# Patient Record
Sex: FEMALE | Race: WHITE | NOT HISPANIC OR LATINO | URBAN - METROPOLITAN AREA
[De-identification: names, ages, dates, MRNs, and addresses within clinical notes are randomized per-mention and may not be internally consistent; named-entity substitution may affect disease eponyms.]

---

## 2020-04-20 ENCOUNTER — INPATIENT (INPATIENT)
Facility: HOSPITAL | Age: 65
LOS: 14 days | Discharge: HOME | DRG: 177 | End: 2020-05-05
Attending: INTERNAL MEDICINE | Admitting: INTERNAL MEDICINE
Payer: MEDICARE

## 2020-04-20 VITALS
RESPIRATION RATE: 26 BRPM | HEIGHT: 65 IN | DIASTOLIC BLOOD PRESSURE: 49 MMHG | OXYGEN SATURATION: 85 % | SYSTOLIC BLOOD PRESSURE: 107 MMHG | WEIGHT: 125 LBS | TEMPERATURE: 99 F | HEART RATE: 91 BPM

## 2020-04-20 DIAGNOSIS — J18.9 PNEUMONIA, UNSPECIFIED ORGANISM: ICD-10-CM

## 2020-04-20 LAB
ALBUMIN SERPL ELPH-MCNC: 3.1 G/DL — LOW (ref 3.3–5.2)
ALP SERPL-CCNC: 72 U/L — SIGNIFICANT CHANGE UP (ref 40–120)
ALT FLD-CCNC: 19 U/L — SIGNIFICANT CHANGE UP
ANION GAP SERPL CALC-SCNC: 13 MMOL/L — SIGNIFICANT CHANGE UP (ref 5–17)
AST SERPL-CCNC: 30 U/L — SIGNIFICANT CHANGE UP
BASOPHILS # BLD AUTO: 0.01 K/UL — SIGNIFICANT CHANGE UP (ref 0–0.2)
BASOPHILS NFR BLD AUTO: 0.3 % — SIGNIFICANT CHANGE UP (ref 0–2)
BILIRUB SERPL-MCNC: <0.2 MG/DL — LOW (ref 0.4–2)
BUN SERPL-MCNC: 23 MG/DL — HIGH (ref 8–20)
CALCIUM SERPL-MCNC: 8.8 MG/DL — SIGNIFICANT CHANGE UP (ref 8.6–10.2)
CHLORIDE SERPL-SCNC: 102 MMOL/L — SIGNIFICANT CHANGE UP (ref 98–107)
CO2 SERPL-SCNC: 26 MMOL/L — SIGNIFICANT CHANGE UP (ref 22–29)
CREAT SERPL-MCNC: 1.37 MG/DL — HIGH (ref 0.5–1.3)
CRP SERPL-MCNC: 10.18 MG/DL — HIGH (ref 0–0.4)
EOSINOPHIL # BLD AUTO: 0.01 K/UL — SIGNIFICANT CHANGE UP (ref 0–0.5)
EOSINOPHIL NFR BLD AUTO: 0.3 % — SIGNIFICANT CHANGE UP (ref 0–6)
FERRITIN SERPL-MCNC: 246 NG/ML — HIGH (ref 15–150)
GLUCOSE SERPL-MCNC: 96 MG/DL — SIGNIFICANT CHANGE UP (ref 70–99)
HCT VFR BLD CALC: 31.4 % — LOW (ref 34.5–45)
HGB BLD-MCNC: 9.9 G/DL — LOW (ref 11.5–15.5)
IMM GRANULOCYTES NFR BLD AUTO: 0.3 % — SIGNIFICANT CHANGE UP (ref 0–1.5)
LDH SERPL L TO P-CCNC: 335 U/L — HIGH (ref 98–192)
LYMPHOCYTES # BLD AUTO: 0.67 K/UL — LOW (ref 1–3.3)
LYMPHOCYTES # BLD AUTO: 20.7 % — SIGNIFICANT CHANGE UP (ref 13–44)
MCHC RBC-ENTMCNC: 31.1 PG — SIGNIFICANT CHANGE UP (ref 27–34)
MCHC RBC-ENTMCNC: 31.5 GM/DL — LOW (ref 32–36)
MCV RBC AUTO: 98.7 FL — SIGNIFICANT CHANGE UP (ref 80–100)
MONOCYTES # BLD AUTO: 0.15 K/UL — SIGNIFICANT CHANGE UP (ref 0–0.9)
MONOCYTES NFR BLD AUTO: 4.6 % — SIGNIFICANT CHANGE UP (ref 2–14)
NEUTROPHILS # BLD AUTO: 2.38 K/UL — SIGNIFICANT CHANGE UP (ref 1.8–7.4)
NEUTROPHILS NFR BLD AUTO: 73.8 % — SIGNIFICANT CHANGE UP (ref 43–77)
PLATELET # BLD AUTO: 281 K/UL — SIGNIFICANT CHANGE UP (ref 150–400)
POTASSIUM SERPL-MCNC: 4.8 MMOL/L — SIGNIFICANT CHANGE UP (ref 3.5–5.3)
POTASSIUM SERPL-SCNC: 4.8 MMOL/L — SIGNIFICANT CHANGE UP (ref 3.5–5.3)
PROCALCITONIN SERPL-MCNC: 0.16 NG/ML — HIGH (ref 0.02–0.1)
PROT SERPL-MCNC: 6.2 G/DL — LOW (ref 6.6–8.7)
RBC # BLD: 3.18 M/UL — LOW (ref 3.8–5.2)
RBC # FLD: 14.4 % — SIGNIFICANT CHANGE UP (ref 10.3–14.5)
SODIUM SERPL-SCNC: 141 MMOL/L — SIGNIFICANT CHANGE UP (ref 135–145)
WBC # BLD: 3.23 K/UL — LOW (ref 3.8–10.5)
WBC # FLD AUTO: 3.23 K/UL — LOW (ref 3.8–10.5)

## 2020-04-20 PROCEDURE — 99222 1ST HOSP IP/OBS MODERATE 55: CPT | Mod: CS

## 2020-04-20 PROCEDURE — 71045 X-RAY EXAM CHEST 1 VIEW: CPT | Mod: 26

## 2020-04-20 PROCEDURE — 99285 EMERGENCY DEPT VISIT HI MDM: CPT | Mod: CS

## 2020-04-20 RX ORDER — OXCARBAZEPINE 300 MG/1
300 TABLET, FILM COATED ORAL
Refills: 0 | Status: DISCONTINUED | OUTPATIENT
Start: 2020-04-20 | End: 2020-05-05

## 2020-04-20 RX ORDER — MIRTAZAPINE 45 MG/1
15 TABLET, ORALLY DISINTEGRATING ORAL AT BEDTIME
Refills: 0 | Status: DISCONTINUED | OUTPATIENT
Start: 2020-04-20 | End: 2020-05-05

## 2020-04-20 RX ORDER — HYDROXYCHLOROQUINE SULFATE 200 MG
800 TABLET ORAL EVERY 24 HOURS
Refills: 0 | Status: COMPLETED | OUTPATIENT
Start: 2020-04-20 | End: 2020-04-20

## 2020-04-20 RX ORDER — ACETAMINOPHEN 500 MG
650 TABLET ORAL EVERY 6 HOURS
Refills: 0 | Status: DISCONTINUED | OUTPATIENT
Start: 2020-04-20 | End: 2020-04-23

## 2020-04-20 RX ORDER — SERTRALINE 25 MG/1
50 TABLET, FILM COATED ORAL DAILY
Refills: 0 | Status: DISCONTINUED | OUTPATIENT
Start: 2020-04-20 | End: 2020-04-23

## 2020-04-20 RX ORDER — DOCUSATE SODIUM 100 MG
1 CAPSULE ORAL
Qty: 0 | Refills: 0 | DISCHARGE

## 2020-04-20 RX ORDER — SODIUM CHLORIDE 9 MG/ML
1000 INJECTION INTRAMUSCULAR; INTRAVENOUS; SUBCUTANEOUS
Refills: 0 | Status: DISCONTINUED | OUTPATIENT
Start: 2020-04-20 | End: 2020-04-21

## 2020-04-20 RX ORDER — POLYETHYLENE GLYCOL 3350 17 G/17G
17 POWDER, FOR SOLUTION ORAL DAILY
Refills: 0 | Status: DISCONTINUED | OUTPATIENT
Start: 2020-04-20 | End: 2020-05-05

## 2020-04-20 RX ORDER — CINACALCET 30 MG/1
30 TABLET, FILM COATED ORAL
Refills: 0 | Status: DISCONTINUED | OUTPATIENT
Start: 2020-04-20 | End: 2020-04-25

## 2020-04-20 RX ORDER — SERTRALINE 25 MG/1
1 TABLET, FILM COATED ORAL
Qty: 0 | Refills: 0 | DISCHARGE

## 2020-04-20 RX ORDER — CINACALCET 30 MG/1
1 TABLET, FILM COATED ORAL
Qty: 0 | Refills: 0 | DISCHARGE

## 2020-04-20 RX ORDER — OXCARBAZEPINE 300 MG/1
1 TABLET, FILM COATED ORAL
Qty: 0 | Refills: 0 | DISCHARGE

## 2020-04-20 RX ORDER — OLANZAPINE 15 MG/1
5 TABLET, FILM COATED ORAL
Refills: 0 | Status: DISCONTINUED | OUTPATIENT
Start: 2020-04-20 | End: 2020-04-27

## 2020-04-20 RX ORDER — HYDROXYCHLOROQUINE SULFATE 200 MG
400 TABLET ORAL EVERY 24 HOURS
Refills: 0 | Status: COMPLETED | OUTPATIENT
Start: 2020-04-21 | End: 2020-04-24

## 2020-04-20 RX ORDER — CEFTRIAXONE 500 MG/1
1000 INJECTION, POWDER, FOR SOLUTION INTRAMUSCULAR; INTRAVENOUS ONCE
Refills: 0 | Status: COMPLETED | OUTPATIENT
Start: 2020-04-20 | End: 2020-04-20

## 2020-04-20 RX ORDER — SENNA PLUS 8.6 MG/1
2 TABLET ORAL AT BEDTIME
Refills: 0 | Status: DISCONTINUED | OUTPATIENT
Start: 2020-04-20 | End: 2020-05-05

## 2020-04-20 RX ORDER — LEVOTHYROXINE SODIUM 125 MCG
1 TABLET ORAL
Qty: 0 | Refills: 0 | DISCHARGE

## 2020-04-20 RX ORDER — MIRTAZAPINE 45 MG/1
1 TABLET, ORALLY DISINTEGRATING ORAL
Qty: 0 | Refills: 0 | DISCHARGE

## 2020-04-20 RX ORDER — LEVOTHYROXINE SODIUM 125 MCG
75 TABLET ORAL DAILY
Refills: 0 | Status: DISCONTINUED | OUTPATIENT
Start: 2020-04-20 | End: 2020-05-05

## 2020-04-20 RX ORDER — HYDROXYCHLOROQUINE SULFATE 200 MG
TABLET ORAL
Refills: 0 | Status: COMPLETED | OUTPATIENT
Start: 2020-04-20 | End: 2020-04-24

## 2020-04-20 RX ORDER — ALBUTEROL 90 UG/1
2 AEROSOL, METERED ORAL EVERY 6 HOURS
Refills: 0 | Status: DISCONTINUED | OUTPATIENT
Start: 2020-04-20 | End: 2020-05-05

## 2020-04-20 RX ORDER — AZITHROMYCIN 500 MG/1
500 TABLET, FILM COATED ORAL ONCE
Refills: 0 | Status: DISCONTINUED | OUTPATIENT
Start: 2020-04-20 | End: 2020-04-20

## 2020-04-20 RX ORDER — ENOXAPARIN SODIUM 100 MG/ML
40 INJECTION SUBCUTANEOUS DAILY
Refills: 0 | Status: DISCONTINUED | OUTPATIENT
Start: 2020-04-20 | End: 2020-05-05

## 2020-04-20 RX ADMIN — CEFTRIAXONE 1000 MILLIGRAM(S): 500 INJECTION, POWDER, FOR SOLUTION INTRAMUSCULAR; INTRAVENOUS at 18:13

## 2020-04-20 RX ADMIN — CINACALCET 30 MILLIGRAM(S): 30 TABLET, FILM COATED ORAL at 21:58

## 2020-04-20 RX ADMIN — SENNA PLUS 2 TABLET(S): 8.6 TABLET ORAL at 21:59

## 2020-04-20 RX ADMIN — OLANZAPINE 5 MILLIGRAM(S): 15 TABLET, FILM COATED ORAL at 21:59

## 2020-04-20 RX ADMIN — SODIUM CHLORIDE 83 MILLILITER(S): 9 INJECTION INTRAMUSCULAR; INTRAVENOUS; SUBCUTANEOUS at 19:02

## 2020-04-20 RX ADMIN — MIRTAZAPINE 15 MILLIGRAM(S): 45 TABLET, ORALLY DISINTEGRATING ORAL at 21:59

## 2020-04-20 RX ADMIN — Medication 800 MILLIGRAM(S): at 21:58

## 2020-04-20 RX ADMIN — OXCARBAZEPINE 300 MILLIGRAM(S): 300 TABLET, FILM COATED ORAL at 21:59

## 2020-04-20 RX ADMIN — CEFTRIAXONE 100 MILLIGRAM(S): 500 INJECTION, POWDER, FOR SOLUTION INTRAMUSCULAR; INTRAVENOUS at 17:28

## 2020-04-20 NOTE — H&P ADULT - HISTORY OF PRESENT ILLNESS
66 yo F w/ hx bipolar disorder, intermittently nonverbal presents to ER for persistent fevers and new hypoxia (80's on room air) since being diagnosed with COVID week prior as outpatient.  per aide at bedside patient noted to have poor PO intake, poor urine output and fevers/hypoxia. been checking pulse ox intermittently. denies nausea/vomiting or diarrhea. +BM's.   at baseline, ambulates and able to feed self.   ros unable to obtain as patient not responding to questions/commands    in Er , hypoxic at 85% RA and improved with 100% o2

## 2020-04-20 NOTE — ED ADULT NURSE REASSESSMENT NOTE - NS ED NURSE REASSESS COMMENT FT1
02 changed from NRB  to 4L NC  magdy well  aide at bedside  ginger ale given.   pt not talking  'I guess she is scared"

## 2020-04-20 NOTE — H&P ADULT - ASSESSMENT
66 yo F w/ hx bipolar disorder, intermittently nonverbal presents to ER for persistent fevers and new hypoxia (80's on room air) since being diagnosed with COVID week prior as outpatient.  in Er , hypoxic at 85% RA and improved with 100% o2      acute hypoxic resp failure due to COVID19    plaquenil, o2 as tolerated     antitussives, antipyretics     trend inflammatory markers, if worsening start steroids    f/u CXR read     ALVARO vs CKD? (side effect of prior lithium use?)   trend, IVF    urinalysis/urine cx     bipolar disorder: continue home meds    sertraline, oxcarbazepine, olanzapine, remeron    hypothyroid: synthroid    FULL code after discussion with daughter/HCP Arcelia [phone]

## 2020-04-20 NOTE — ED PROVIDER NOTE - CLINICAL SUMMARY MEDICAL DECISION MAKING FREE TEXT BOX
The patient presents with worsening of SOB and cough with COVID-19 and will admit for further evaluation

## 2020-04-20 NOTE — ED PROVIDER NOTE - OBJECTIVE STATEMENT
The patient is a 65 year old female presents with fever and cough and SOB and had the COVID-19 test last week and came back positive and worsening of symptoms of SOB and dyspnea

## 2020-04-20 NOTE — ED ADULT NURSE REASSESSMENT NOTE - NS ED NURSE REASSESS COMMENT FT1
pts aide at bedside pt lives with aide at home   +COVID from last week,  'this week her o2 levels went down'  pt on NRB presently   sats 99%

## 2020-04-21 LAB
ALBUMIN SERPL ELPH-MCNC: 3.4 G/DL — SIGNIFICANT CHANGE UP (ref 3.3–5.2)
ALP SERPL-CCNC: 82 U/L — SIGNIFICANT CHANGE UP (ref 40–120)
ALT FLD-CCNC: 18 U/L — SIGNIFICANT CHANGE UP
ANION GAP SERPL CALC-SCNC: 14 MMOL/L — SIGNIFICANT CHANGE UP (ref 5–17)
AST SERPL-CCNC: 29 U/L — SIGNIFICANT CHANGE UP
BASOPHILS # BLD AUTO: 0 K/UL — SIGNIFICANT CHANGE UP (ref 0–0.2)
BASOPHILS NFR BLD AUTO: 0 % — SIGNIFICANT CHANGE UP (ref 0–2)
BILIRUB SERPL-MCNC: <0.2 MG/DL — LOW (ref 0.4–2)
BUN SERPL-MCNC: 20 MG/DL — SIGNIFICANT CHANGE UP (ref 8–20)
CALCIUM SERPL-MCNC: 9.5 MG/DL — SIGNIFICANT CHANGE UP (ref 8.6–10.2)
CHLORIDE SERPL-SCNC: 113 MMOL/L — HIGH (ref 98–107)
CO2 SERPL-SCNC: 28 MMOL/L — SIGNIFICANT CHANGE UP (ref 22–29)
CREAT SERPL-MCNC: 1.24 MG/DL — SIGNIFICANT CHANGE UP (ref 0.5–1.3)
CRP SERPL-MCNC: 12.28 MG/DL — HIGH (ref 0–0.4)
EOSINOPHIL # BLD AUTO: 0.01 K/UL — SIGNIFICANT CHANGE UP (ref 0–0.5)
EOSINOPHIL NFR BLD AUTO: 0.3 % — SIGNIFICANT CHANGE UP (ref 0–6)
FERRITIN SERPL-MCNC: 271 NG/ML — HIGH (ref 15–150)
GLUCOSE SERPL-MCNC: 77 MG/DL — SIGNIFICANT CHANGE UP (ref 70–99)
HCT VFR BLD CALC: 36.3 % — SIGNIFICANT CHANGE UP (ref 34.5–45)
HCV AB S/CO SERPL IA: 0.1 S/CO — SIGNIFICANT CHANGE UP (ref 0–0.99)
HCV AB SERPL-IMP: SIGNIFICANT CHANGE UP
HGB BLD-MCNC: 11.3 G/DL — LOW (ref 11.5–15.5)
IMM GRANULOCYTES NFR BLD AUTO: 0.3 % — SIGNIFICANT CHANGE UP (ref 0–1.5)
LDH SERPL L TO P-CCNC: 349 U/L — HIGH (ref 98–192)
LITHIUM SERPL-MCNC: <0.1 MMOL/L — LOW (ref 0.5–1.5)
LYMPHOCYTES # BLD AUTO: 0.57 K/UL — LOW (ref 1–3.3)
LYMPHOCYTES # BLD AUTO: 18.8 % — SIGNIFICANT CHANGE UP (ref 13–44)
MCHC RBC-ENTMCNC: 31 PG — SIGNIFICANT CHANGE UP (ref 27–34)
MCHC RBC-ENTMCNC: 31.1 GM/DL — LOW (ref 32–36)
MCV RBC AUTO: 99.7 FL — SIGNIFICANT CHANGE UP (ref 80–100)
MONOCYTES # BLD AUTO: 0.11 K/UL — SIGNIFICANT CHANGE UP (ref 0–0.9)
MONOCYTES NFR BLD AUTO: 3.6 % — SIGNIFICANT CHANGE UP (ref 2–14)
NEUTROPHILS # BLD AUTO: 2.34 K/UL — SIGNIFICANT CHANGE UP (ref 1.8–7.4)
NEUTROPHILS NFR BLD AUTO: 77 % — SIGNIFICANT CHANGE UP (ref 43–77)
PLATELET # BLD AUTO: 298 K/UL — SIGNIFICANT CHANGE UP (ref 150–400)
POTASSIUM SERPL-MCNC: 5.7 MMOL/L — HIGH (ref 3.5–5.3)
POTASSIUM SERPL-SCNC: 5.7 MMOL/L — HIGH (ref 3.5–5.3)
PROT SERPL-MCNC: 6.9 G/DL — SIGNIFICANT CHANGE UP (ref 6.6–8.7)
RBC # BLD: 3.64 M/UL — LOW (ref 3.8–5.2)
RBC # FLD: 14.5 % — SIGNIFICANT CHANGE UP (ref 10.3–14.5)
SODIUM SERPL-SCNC: 155 MMOL/L — HIGH (ref 135–145)
WBC # BLD: 3.04 K/UL — LOW (ref 3.8–10.5)
WBC # FLD AUTO: 3.04 K/UL — LOW (ref 3.8–10.5)

## 2020-04-21 PROCEDURE — 99233 SBSQ HOSP IP/OBS HIGH 50: CPT | Mod: CS

## 2020-04-21 RX ORDER — SODIUM CHLORIDE 9 MG/ML
1000 INJECTION, SOLUTION INTRAVENOUS
Refills: 0 | Status: DISCONTINUED | OUTPATIENT
Start: 2020-04-21 | End: 2020-04-22

## 2020-04-21 RX ADMIN — Medication 400 MILLIGRAM(S): at 17:06

## 2020-04-21 RX ADMIN — OXCARBAZEPINE 300 MILLIGRAM(S): 300 TABLET, FILM COATED ORAL at 17:06

## 2020-04-21 RX ADMIN — POLYETHYLENE GLYCOL 3350 17 GRAM(S): 17 POWDER, FOR SOLUTION ORAL at 12:25

## 2020-04-21 RX ADMIN — OLANZAPINE 5 MILLIGRAM(S): 15 TABLET, FILM COATED ORAL at 05:37

## 2020-04-21 RX ADMIN — CINACALCET 30 MILLIGRAM(S): 30 TABLET, FILM COATED ORAL at 05:37

## 2020-04-21 RX ADMIN — SERTRALINE 50 MILLIGRAM(S): 25 TABLET, FILM COATED ORAL at 12:25

## 2020-04-21 RX ADMIN — OLANZAPINE 5 MILLIGRAM(S): 15 TABLET, FILM COATED ORAL at 17:06

## 2020-04-21 RX ADMIN — ENOXAPARIN SODIUM 40 MILLIGRAM(S): 100 INJECTION SUBCUTANEOUS at 12:25

## 2020-04-21 RX ADMIN — SENNA PLUS 2 TABLET(S): 8.6 TABLET ORAL at 21:00

## 2020-04-21 RX ADMIN — OXCARBAZEPINE 300 MILLIGRAM(S): 300 TABLET, FILM COATED ORAL at 05:37

## 2020-04-21 RX ADMIN — CINACALCET 30 MILLIGRAM(S): 30 TABLET, FILM COATED ORAL at 16:35

## 2020-04-21 RX ADMIN — Medication 75 MICROGRAM(S): at 05:37

## 2020-04-21 RX ADMIN — MIRTAZAPINE 15 MILLIGRAM(S): 45 TABLET, ORALLY DISINTEGRATING ORAL at 21:00

## 2020-04-21 NOTE — PROGRESS NOTE ADULT - SUBJECTIVE AND OBJECTIVE BOX
CC: hypoxia (20 Apr 2020 18:00)    INTERVAL HPI/OVERNIGHT EVENTS:  this morning 95% on 4 L NC    Vital Signs Last 24 Hrs  T(C): 37.3 (21 Apr 2020 03:46), Max: 37.6 (21 Apr 2020 00:25)  T(F): 99.1 (21 Apr 2020 03:46), Max: 99.6 (21 Apr 2020 00:25)  HR: 84 (21 Apr 2020 03:46) (70 - 91)  BP: 143/82 (21 Apr 2020 03:46) (107/49 - 143/82)  BP(mean): --  RR: 18 (21 Apr 2020 03:46) (18 - 26)  SpO2: 95% (21 Apr 2020 03:46) (85% - 99%)    PHYSICAL EXAM:  General: Well developed; well nourished; in no acute distress  Eyes: PERRLA, EOMI; conjunctiva and sclera clear  Head: Normocephalic; atraumatic  ENMT: No nasal discharge; airway clear  Neck: Supple; non tender; no masses  Respiratory: No wheezes, rales or rhonchi  Cardiovascular: Regular rate and rhythm. S1 and S2 Normal; No murmurs, gallops or rubs  Gastrointestinal: Soft non-tender non-distended; Normal bowel sounds  Genitourinary: No costovertebral angle tenderness  Extremities: Normal range of motion, No clubbing, cyanosis or edema  Vascular: Peripheral pulses palpable 2+ bilaterally  Neurological: Alert and oriented x4  Skin: Warm and dry. No acute rash  Lymph Nodes: No acute cervical adenopathy  Musculoskeletal: Normal gait, tone, without deformities  Psychiatric: Cooperative and appropriate    I&O's Detail                        9.9    3.23  )-----------( 281      ( 20 Apr 2020 16:12 )             31.4     20 Apr 2020 16:12    141    |  102    |  23.0   ----------------------------<  96     4.8     |  26.0   |  1.37     Ca    8.8        20 Apr 2020 16:12    TPro  6.2    /  Alb  3.1    /  TBili  <0.2   /  DBili  x      /  AST  30     /  ALT  19     /  AlkPhos  72     20 Apr 2020 16:12    LIVER FUNCTIONS - ( 20 Apr 2020 16:12 )  Alb: 3.1 g/dL / Pro: 6.2 g/dL / ALK PHOS: 72 U/L / ALT: 19 U/L / AST: 30 U/L / GGT: x           MEDICATIONS  (STANDING):  cinacalcet 30 milliGRAM(s) Oral two times a day  enoxaparin Injectable 40 milliGRAM(s) SubCutaneous daily  hydroxychloroquine   Oral   hydroxychloroquine 400 milliGRAM(s) Oral every 24 hours  levothyroxine 75 MICROGram(s) Oral daily  mirtazapine 15 milliGRAM(s) Oral at bedtime  OLANZapine 5 milliGRAM(s) Oral two times a day  OXcarbazepine 300 milliGRAM(s) Oral two times a day  polyethylene glycol 3350 17 Gram(s) Oral daily  senna 2 Tablet(s) Oral at bedtime  sertraline 50 milliGRAM(s) Oral daily  sodium chloride 0.9%. 1000 milliLiter(s) (83 mL/Hr) IV Continuous <Continuous>    MEDICATIONS  (PRN):  acetaminophen   Tablet .. 650 milliGRAM(s) Oral every 6 hours PRN Temp greater or equal to 38C (100.4F), Mild Pain (1 - 3), Moderate Pain (4 - 6)  ALBUTerol    90 MICROgram(s) HFA Inhaler 2 Puff(s) Inhalation every 6 hours PRN Shortness of Breath and/or Wheezing  bisacodyl Suppository 10 milliGRAM(s) Rectal daily PRN Constipation  guaiFENesin   Syrup  (Sugar-Free) 200 milliGRAM(s) Oral every 6 hours PRN Cough      RADIOLOGY & ADDITIONAL TESTS: CC: hypoxia (20 Apr 2020 18:00)    INTERVAL HPI/OVERNIGHT EVENTS:  this morning 90% on 5 L NC  intermittently falls off patient  she is unable to verbalize complaints  no acute events overnight  patient able to open eyes and look around but does not follow commands  resists when NC is adjusted in her nose    Vital Signs Last 24 Hrs  T(C): 37.3 (21 Apr 2020 03:46), Max: 37.6 (21 Apr 2020 00:25)  T(F): 99.1 (21 Apr 2020 03:46), Max: 99.6 (21 Apr 2020 00:25)  HR: 84 (21 Apr 2020 03:46) (70 - 91)  BP: 143/82 (21 Apr 2020 03:46) (107/49 - 143/82)  BP(mean): --  RR: 18 (21 Apr 2020 03:46) (18 - 26)  SpO2: 95% (21 Apr 2020 03:46) (85% - 99%)    PHYSICAL EXAM:  General: in no acute distress; anxious appearing, resting tremor of the upper extremities noted  ENMT: No nasal discharge; airway clear; edentulous; NC in place  Gastrointestinal: Soft non-tender non-distended; Normal bowel sounds  Extremities: Normal range of motion, No clubbing, cyanosis or edema  Vascular: Peripheral pulses palpable 2+ bilaterally  Neurological: Alert at times during exam and resists noxious stimuli    I&O's Detail                        9.9    3.23  )-----------( 281      ( 20 Apr 2020 16:12 )             31.4     20 Apr 2020 16:12    141    |  102    |  23.0   ----------------------------<  96     4.8     |  26.0   |  1.37     Ca    8.8        20 Apr 2020 16:12    TPro  6.2    /  Alb  3.1    /  TBili  <0.2   /  DBili  x      /  AST  30     /  ALT  19     /  AlkPhos  72     20 Apr 2020 16:12    LIVER FUNCTIONS - ( 20 Apr 2020 16:12 )  Alb: 3.1 g/dL / Pro: 6.2 g/dL / ALK PHOS: 72 U/L / ALT: 19 U/L / AST: 30 U/L / GGT: x           MEDICATIONS  (STANDING):  cinacalcet 30 milliGRAM(s) Oral two times a day  enoxaparin Injectable 40 milliGRAM(s) SubCutaneous daily  hydroxychloroquine   Oral   hydroxychloroquine 400 milliGRAM(s) Oral every 24 hours  levothyroxine 75 MICROGram(s) Oral daily  mirtazapine 15 milliGRAM(s) Oral at bedtime  OLANZapine 5 milliGRAM(s) Oral two times a day  OXcarbazepine 300 milliGRAM(s) Oral two times a day  polyethylene glycol 3350 17 Gram(s) Oral daily  senna 2 Tablet(s) Oral at bedtime  sertraline 50 milliGRAM(s) Oral daily  sodium chloride 0.9%. 1000 milliLiter(s) (83 mL/Hr) IV Continuous <Continuous>    MEDICATIONS  (PRN):  acetaminophen   Tablet .. 650 milliGRAM(s) Oral every 6 hours PRN Temp greater or equal to 38C (100.4F), Mild Pain (1 - 3), Moderate Pain (4 - 6)  ALBUTerol    90 MICROgram(s) HFA Inhaler 2 Puff(s) Inhalation every 6 hours PRN Shortness of Breath and/or Wheezing  bisacodyl Suppository 10 milliGRAM(s) Rectal daily PRN Constipation  guaiFENesin   Syrup  (Sugar-Free) 200 milliGRAM(s) Oral every 6 hours PRN Cough      RADIOLOGY & ADDITIONAL TESTS:

## 2020-04-21 NOTE — PROGRESS NOTE ADULT - ASSESSMENT
66 yo F w/ hx bipolar disorder, intermittently nonverbal presents to ER for persistent fevers and new hypoxia (80's on room air) since being diagnosed with COVID week prior as outpatient. In the ED patient as noted to be hypoxic at 85% RA and improved with 100% o2 with 4 L NC    #acute hypoxic resp failure due to COVID19  - plaquenil, o2 as tolerated   - antitussives, antipyretics   - trend inflammatory markers, if worsening start steroids    #ALVARO vs CKD? (side effect of prior lithium use?)  - f/u BMP  - urinalysis/urine cx     #bipolar disorder: continue home meds  sertraline, oxcarbazepine, olanzapine, remeron    #hypothyroid: synthroid    FULL code after discussion with daughter/HCP Arcelia [phone] 64 yo F w/ hx bipolar disorder, intermittently nonverbal presents to ER for persistent fevers and new hypoxia (80's on room air) since being diagnosed with COVID week prior as outpatient. In the ED patient as noted to be hypoxic at 85% RA and improved with 100% o2 with 4 L NC.     #acute hypoxic resp failure due to COVID19  - plaquenil, o2 as tolerated   - antitussives, antipyretics   - trend inflammatory markers, if worsening start steroids    #ALVARO vs CKD  - f/u BMP  - urinalysis/urine cx  - trialing IV fluids - continue for now    #bipolar disorder: continue home meds  - sertraline, oxcarbazepine, olanzapine, remeron    #hypothyroid: synthroid    #Advanced directives; currently full code after admitting physician discussed with daughter/HCP Arcelia [phone]    #DVT ppx - lovenox daily

## 2020-04-22 LAB
ANION GAP SERPL CALC-SCNC: 15 MMOL/L — SIGNIFICANT CHANGE UP (ref 5–17)
APPEARANCE UR: CLEAR — SIGNIFICANT CHANGE UP
BILIRUB UR-MCNC: NEGATIVE — SIGNIFICANT CHANGE UP
BUN SERPL-MCNC: 16 MG/DL — SIGNIFICANT CHANGE UP (ref 8–20)
CALCIUM SERPL-MCNC: 9.4 MG/DL — SIGNIFICANT CHANGE UP (ref 8.6–10.2)
CHLORIDE SERPL-SCNC: 116 MMOL/L — HIGH (ref 98–107)
CO2 SERPL-SCNC: 27 MMOL/L — SIGNIFICANT CHANGE UP (ref 22–29)
COLOR SPEC: YELLOW — SIGNIFICANT CHANGE UP
CREAT SERPL-MCNC: 1.18 MG/DL — SIGNIFICANT CHANGE UP (ref 0.5–1.3)
DIFF PNL FLD: ABNORMAL
EPI CELLS # UR: SIGNIFICANT CHANGE UP
GLUCOSE SERPL-MCNC: 100 MG/DL — HIGH (ref 70–99)
GLUCOSE UR QL: NEGATIVE MG/DL — SIGNIFICANT CHANGE UP
KETONES UR-MCNC: NEGATIVE — SIGNIFICANT CHANGE UP
LEUKOCYTE ESTERASE UR-ACNC: NEGATIVE — SIGNIFICANT CHANGE UP
NITRITE UR-MCNC: NEGATIVE — SIGNIFICANT CHANGE UP
PH UR: 7 — SIGNIFICANT CHANGE UP (ref 5–8)
POTASSIUM SERPL-MCNC: 4.9 MMOL/L — SIGNIFICANT CHANGE UP (ref 3.5–5.3)
POTASSIUM SERPL-SCNC: 4.9 MMOL/L — SIGNIFICANT CHANGE UP (ref 3.5–5.3)
PROT UR-MCNC: 15 MG/DL
RBC CASTS # UR COMP ASSIST: SIGNIFICANT CHANGE UP /HPF (ref 0–4)
SODIUM SERPL-SCNC: 158 MMOL/L — HIGH (ref 135–145)
SP GR SPEC: 1 — LOW (ref 1.01–1.02)
UROBILINOGEN FLD QL: NEGATIVE MG/DL — SIGNIFICANT CHANGE UP
WBC UR QL: NEGATIVE — SIGNIFICANT CHANGE UP

## 2020-04-22 PROCEDURE — 99233 SBSQ HOSP IP/OBS HIGH 50: CPT | Mod: CS

## 2020-04-22 RX ORDER — SODIUM CHLORIDE 9 MG/ML
1000 INJECTION, SOLUTION INTRAVENOUS
Refills: 0 | Status: DISCONTINUED | OUTPATIENT
Start: 2020-04-22 | End: 2020-04-24

## 2020-04-22 RX ADMIN — SODIUM CHLORIDE 70 MILLILITER(S): 9 INJECTION, SOLUTION INTRAVENOUS at 21:30

## 2020-04-22 RX ADMIN — OLANZAPINE 5 MILLIGRAM(S): 15 TABLET, FILM COATED ORAL at 05:55

## 2020-04-22 RX ADMIN — CINACALCET 30 MILLIGRAM(S): 30 TABLET, FILM COATED ORAL at 05:55

## 2020-04-22 RX ADMIN — CINACALCET 30 MILLIGRAM(S): 30 TABLET, FILM COATED ORAL at 17:23

## 2020-04-22 RX ADMIN — SERTRALINE 50 MILLIGRAM(S): 25 TABLET, FILM COATED ORAL at 10:54

## 2020-04-22 RX ADMIN — OLANZAPINE 5 MILLIGRAM(S): 15 TABLET, FILM COATED ORAL at 17:23

## 2020-04-22 RX ADMIN — Medication 400 MILLIGRAM(S): at 17:23

## 2020-04-22 RX ADMIN — OXCARBAZEPINE 300 MILLIGRAM(S): 300 TABLET, FILM COATED ORAL at 17:23

## 2020-04-22 RX ADMIN — Medication 75 MICROGRAM(S): at 05:55

## 2020-04-22 RX ADMIN — Medication 650 MILLIGRAM(S): at 16:16

## 2020-04-22 RX ADMIN — ENOXAPARIN SODIUM 40 MILLIGRAM(S): 100 INJECTION SUBCUTANEOUS at 10:54

## 2020-04-22 RX ADMIN — OXCARBAZEPINE 300 MILLIGRAM(S): 300 TABLET, FILM COATED ORAL at 05:55

## 2020-04-22 NOTE — DIETITIAN INITIAL EVALUATION ADULT. - ADD RECOMMEND
Add Ensure Enlive BID; Rx MVI, thiamine and vit C daily; Provide encouragement/assistance as needed during mealtimes to inc PO

## 2020-04-22 NOTE — DIETITIAN INITIAL EVALUATION ADULT. - OTHER INFO
66yo female, minimally verbal with bipolar depression, hx hypothyroid, admitted with acute resp. failure 2/2 COVID-19, ALVARO/CKD. Aware Pt with poor PO intake PTA, no PO documented since admission. Remeron with appetite stimulant effect ordered.

## 2020-04-22 NOTE — PROGRESS NOTE ADULT - ASSESSMENT
66 yo F w/ hx bipolar disorder, intermittently nonverbal presents to ER for persistent fevers and new hypoxia (80's on room air) since being diagnosed with COVID week prior as outpatient. In the ED patient as noted to be hypoxic at 85% RA and improved with 100% o2 with 4 L NC.     #acute hypoxic resp failure due to COVID19  - plaquenil day 3/5, o2 as tolerated  - antitussives, antipyretics   - trend inflammatory markers, if worsening start steroids  - so far has been remaining stable  - her aide service at her private apartment will resume after 14 days of quarantine - will confirm with CM  - PT today - patient does ambulate with assistance and assist device (walker) at baseline    #ALVARO vs CKD with hypernatremia - likely CKD per daughter history; lithium induced  - f/u BMP  - urinalysis/urine cx  - trialing IV fluids - continue for now  - follow up BMP this morning    #bipolar disorder: continue home meds; for years was on lithium which controlled symptoms but was stopped due to kidney damage; she has struggled to treat bipolar since then  - sertraline, oxcarbazepine, olanzapine, remeron  - lithium level low on this admission    #hypothyroid: synthroid    #Advanced directives; currently full code after admitting physician discussed with daughter HCP Arcelia [phone]    #DVT ppx - lovenox daily

## 2020-04-23 DIAGNOSIS — F31.9 BIPOLAR DISORDER, UNSPECIFIED: ICD-10-CM

## 2020-04-23 DIAGNOSIS — F05 DELIRIUM DUE TO KNOWN PHYSIOLOGICAL CONDITION: ICD-10-CM

## 2020-04-23 LAB
AMMONIA BLD-MCNC: 33 UMOL/L — SIGNIFICANT CHANGE UP (ref 11–55)
ANION GAP SERPL CALC-SCNC: 12 MMOL/L — SIGNIFICANT CHANGE UP (ref 5–17)
BASOPHILS # BLD AUTO: 0.01 K/UL — SIGNIFICANT CHANGE UP (ref 0–0.2)
BASOPHILS NFR BLD AUTO: 0.3 % — SIGNIFICANT CHANGE UP (ref 0–2)
BUN SERPL-MCNC: 20 MG/DL — SIGNIFICANT CHANGE UP (ref 8–20)
CALCIUM SERPL-MCNC: 9.5 MG/DL — SIGNIFICANT CHANGE UP (ref 8.6–10.2)
CHLORIDE SERPL-SCNC: 120 MMOL/L — HIGH (ref 98–107)
CK MB CFR SERPL CALC: 1.9 NG/ML — SIGNIFICANT CHANGE UP (ref 0–6.7)
CK SERPL-CCNC: 215 U/L — HIGH (ref 25–170)
CO2 SERPL-SCNC: 30 MMOL/L — HIGH (ref 22–29)
CREAT SERPL-MCNC: 1.66 MG/DL — HIGH (ref 0.5–1.3)
CULTURE RESULTS: NO GROWTH — SIGNIFICANT CHANGE UP
EOSINOPHIL # BLD AUTO: 0.01 K/UL — SIGNIFICANT CHANGE UP (ref 0–0.5)
EOSINOPHIL NFR BLD AUTO: 0.3 % — SIGNIFICANT CHANGE UP (ref 0–6)
GLUCOSE SERPL-MCNC: 104 MG/DL — HIGH (ref 70–99)
HCT VFR BLD CALC: 34.1 % — LOW (ref 34.5–45)
HGB BLD-MCNC: 10.3 G/DL — LOW (ref 11.5–15.5)
IMM GRANULOCYTES NFR BLD AUTO: 0.8 % — SIGNIFICANT CHANGE UP (ref 0–1.5)
LYMPHOCYTES # BLD AUTO: 0.58 K/UL — LOW (ref 1–3.3)
LYMPHOCYTES # BLD AUTO: 14.9 % — SIGNIFICANT CHANGE UP (ref 13–44)
MAGNESIUM SERPL-MCNC: 2 MG/DL — SIGNIFICANT CHANGE UP (ref 1.6–2.6)
MCHC RBC-ENTMCNC: 30.2 GM/DL — LOW (ref 32–36)
MCHC RBC-ENTMCNC: 30.9 PG — SIGNIFICANT CHANGE UP (ref 27–34)
MCV RBC AUTO: 102.4 FL — HIGH (ref 80–100)
MONOCYTES # BLD AUTO: 0.28 K/UL — SIGNIFICANT CHANGE UP (ref 0–0.9)
MONOCYTES NFR BLD AUTO: 7.2 % — SIGNIFICANT CHANGE UP (ref 2–14)
NEUTROPHILS # BLD AUTO: 2.98 K/UL — SIGNIFICANT CHANGE UP (ref 1.8–7.4)
NEUTROPHILS NFR BLD AUTO: 76.5 % — SIGNIFICANT CHANGE UP (ref 43–77)
PLATELET # BLD AUTO: 367 K/UL — SIGNIFICANT CHANGE UP (ref 150–400)
POTASSIUM SERPL-MCNC: 4.4 MMOL/L — SIGNIFICANT CHANGE UP (ref 3.5–5.3)
POTASSIUM SERPL-SCNC: 4.4 MMOL/L — SIGNIFICANT CHANGE UP (ref 3.5–5.3)
PROCALCITONIN SERPL-MCNC: 0.15 NG/ML — HIGH (ref 0.02–0.1)
RBC # BLD: 3.33 M/UL — LOW (ref 3.8–5.2)
RBC # FLD: 14.6 % — HIGH (ref 10.3–14.5)
SODIUM SERPL-SCNC: 162 MMOL/L — CRITICAL HIGH (ref 135–145)
SPECIMEN SOURCE: SIGNIFICANT CHANGE UP
TSH SERPL-MCNC: 0.21 UIU/ML — LOW (ref 0.27–4.2)
WBC # BLD: 3.89 K/UL — SIGNIFICANT CHANGE UP (ref 3.8–10.5)
WBC # FLD AUTO: 3.89 K/UL — SIGNIFICANT CHANGE UP (ref 3.8–10.5)

## 2020-04-23 PROCEDURE — 90792 PSYCH DIAG EVAL W/MED SRVCS: CPT

## 2020-04-23 PROCEDURE — 70450 CT HEAD/BRAIN W/O DYE: CPT | Mod: 26

## 2020-04-23 PROCEDURE — 99233 SBSQ HOSP IP/OBS HIGH 50: CPT | Mod: CS

## 2020-04-23 PROCEDURE — 95819 EEG AWAKE AND ASLEEP: CPT | Mod: 26

## 2020-04-23 PROCEDURE — 71045 X-RAY EXAM CHEST 1 VIEW: CPT | Mod: 26

## 2020-04-23 RX ORDER — ACETAMINOPHEN 500 MG
650 TABLET ORAL EVERY 6 HOURS
Refills: 0 | Status: DISCONTINUED | OUTPATIENT
Start: 2020-04-23 | End: 2020-05-05

## 2020-04-23 RX ADMIN — POLYETHYLENE GLYCOL 3350 17 GRAM(S): 17 POWDER, FOR SOLUTION ORAL at 12:23

## 2020-04-23 RX ADMIN — Medication 0.5 MILLIGRAM(S): at 18:22

## 2020-04-23 RX ADMIN — OXCARBAZEPINE 300 MILLIGRAM(S): 300 TABLET, FILM COATED ORAL at 18:22

## 2020-04-23 RX ADMIN — Medication 650 MILLIGRAM(S): at 17:30

## 2020-04-23 RX ADMIN — SENNA PLUS 2 TABLET(S): 8.6 TABLET ORAL at 22:25

## 2020-04-23 RX ADMIN — Medication 650 MILLIGRAM(S): at 11:20

## 2020-04-23 RX ADMIN — OLANZAPINE 5 MILLIGRAM(S): 15 TABLET, FILM COATED ORAL at 18:22

## 2020-04-23 RX ADMIN — ENOXAPARIN SODIUM 40 MILLIGRAM(S): 100 INJECTION SUBCUTANEOUS at 12:24

## 2020-04-23 RX ADMIN — CINACALCET 30 MILLIGRAM(S): 30 TABLET, FILM COATED ORAL at 18:22

## 2020-04-23 RX ADMIN — MIRTAZAPINE 15 MILLIGRAM(S): 45 TABLET, ORALLY DISINTEGRATING ORAL at 22:25

## 2020-04-23 RX ADMIN — Medication 0.5 MILLIGRAM(S): at 13:47

## 2020-04-23 RX ADMIN — Medication 400 MILLIGRAM(S): at 18:22

## 2020-04-23 NOTE — PROCEDURE NOTE - NSINFORMCONSENT_GEN_A_CORE
Benefits, risks, and possible complications of procedure explained to patient/caregiver who verbalized understanding and gave verbal consent./Daughter in agreement

## 2020-04-23 NOTE — EEG REPORT - NS EEG TEXT BOX
CLIFTON MCDERMOTT [id number]     Study Date: 		04-23-20    ROUTINE EEG    Technical Information:			  		  Placement and Labeling of Electrodes:  The EEG was performed utilizing 20 channels referential EEG connections (coronal over temporal over parasagittal montage) using all standard 10-20 electrode placements with EKG.  Recording was at a sampling rate of 256 samples per second per channel.  Time synchronized digital video recording was done simultaneously with EEG recording.  A low light infrared camera was used for low light recording.  Chucho and seizure detection algorithms were utilized.    CSA Technical Component:  Quantitative EEG analysis using a separate Compressed Spectral Array (CSA) software package was conducted in real-time and run at bedside after set up by the technician, digitally displaying the power of electrographic frequencies included in the 1-30Hz band using a graded color map.  This data was reviewed and interpreted independently, and is reported in a separate section below.    --------------------------------------------------------------------------------------------------  History:  CC/ HPI Patient is a 65y old  Female who presents with a chief complaint of hypoxia (23 Apr 2020 14:03)    MEDICATIONS  (STANDING):  cinacalcet 30 milliGRAM(s) Oral two times a day  dextrose 5%. 1000 milliLiter(s) (70 mL/Hr) IV Continuous <Continuous>  enoxaparin Injectable 40 milliGRAM(s) SubCutaneous daily  hydroxychloroquine   Oral   hydroxychloroquine 400 milliGRAM(s) Oral every 24 hours  levothyroxine 75 MICROGram(s) Oral daily  LORazepam     Tablet 0.5 milliGRAM(s) Oral two times a day  mirtazapine 15 milliGRAM(s) Oral at bedtime  OLANZapine 5 milliGRAM(s) Oral two times a day  OXcarbazepine 300 milliGRAM(s) Oral two times a day  polyethylene glycol 3350 17 Gram(s) Oral daily  senna 2 Tablet(s) Oral at bedtime    --------------------------------------------------------------------------------------------------  Study Interpretation:    [[[Abbreviation Key:  PDR=alpha rhythm/posterior dominant rhythm. A-P=anterior posterior gradient.  Amplitude: ‘very low’:<20; ‘low’:20-50; ‘medium’:; ‘high’:>200uV.  Persistence for periodic/rhythmic patterns (% of epoch) ‘rare’:<1%; ‘occasional’:1-10%; ‘frequent’:10-50%; ‘abundant’:50-90%; ‘continuous’:>90%.  Persistence for sporadic discharges: ‘rare’:<1/hr; ‘occasional’:1/min-1/hr; ‘frequent’:>1/min; ‘abundant’:>1/10 sec.  GRDA=generalized rhythmic delta activity, LRDA=lateralized rhythmic delta activity, TIRDA=temporal intermittent rhythmic delta activity, FIRDA=frontal intermittent rhythmic activity. LPD=PLED=lateralized periodic discharges, GPD=generalized periodic discharges, BiPDs=BiPLEDs=bilateral independent periodic epileptiform discharges, SIRPID=stimulus induced rhythmic, periodic, or ictal appearing discharges.  Modifiers: +F=with fast component, +S=with spike component, +R=with rhythmic component.  S-B=burst suppression pattern.  Max=maximal. N1-drowsy, N2-stage II sleep, N3-slow wave sleep.  HV=hyperventilation, PS=photic stimulation]]]    FINDINGS:  The background was continuous, spontaneously variable and reactive.  During wakefulness, the posteriorly dominant rhythm was not seen.      There was more diffuse irregular theta and  polymorphic delta activity present.    Sleep Background:  Stage II sleep transients were not recorded.    Non-Epileptiform Activity:   Diffuse beta activity.     Epileptiform Activity:   Occasional left frontotemporal ( max Fp1/F3/F7) spike wave discharges.     Events:  No clinical events were recorded.  No seizures were recorded.    Activation Procedures:   -Hyperventilation was not performed.    -Photic stimulation was performed and did not elicit any abnormalities.      Artifacts:  Intermittent myogenic and movement artifacts were noted.    ECG:  The heart rate on single channel ECG at baseline was predominantly near BPM = 80-90  -----------------------------------------------------------------------------------------------------    EEG Classification / Summary:  Abnormal EEG study in an altered patient.   Occasional left frontotemporal ( max Fp1/F3/F7) spike wave discharges.   Diffuse beta activity.   Moderate to severe background slowing    -----------------------------------------------------------------------------------------------------    Clinical Impression:  Potential epileptogenic focus in the left frontotemporal region.   Moderate to severe diffuse or multifocal cerebral dysfunction, not specific as to etiology.  Diffuse beta activity secondary to medication effect such as benzodiazepines or barbiturates.   There were no seizures recorded.      -------------------------------------------------------------------------------------------------------  Vale Carlton DO  Epilepsy Fellow, [facility] [place] CLIFTON MCDERMOTT [id number]     Study Date: 		04-23-20    ROUTINE EEG    Technical Information:			  		  Placement and Labeling of Electrodes:  The EEG was performed utilizing 20 channels referential EEG connections (coronal over temporal over parasagittal montage) using all standard 10-20 electrode placements with EKG.  Recording was at a sampling rate of 256 samples per second per channel.  Time synchronized digital video recording was done simultaneously with EEG recording.  A low light infrared camera was used for low light recording.  Chucho and seizure detection algorithms were utilized.    CSA Technical Component:  Quantitative EEG analysis using a separate Compressed Spectral Array (CSA) software package was conducted in real-time and run at bedside after set up by the technician, digitally displaying the power of electrographic frequencies included in the 1-30Hz band using a graded color map.  This data was reviewed and interpreted independently, and is reported in a separate section below.    --------------------------------------------------------------------------------------------------  History:  CC/ HPI Patient is a 65y old  Female who presents with a chief complaint of hypoxia (23 Apr 2020 14:03)    MEDICATIONS  (STANDING):  cinacalcet 30 milliGRAM(s) Oral two times a day  dextrose 5%. 1000 milliLiter(s) (70 mL/Hr) IV Continuous <Continuous>  enoxaparin Injectable 40 milliGRAM(s) SubCutaneous daily  hydroxychloroquine   Oral   hydroxychloroquine 400 milliGRAM(s) Oral every 24 hours  levothyroxine 75 MICROGram(s) Oral daily  LORazepam     Tablet 0.5 milliGRAM(s) Oral two times a day  mirtazapine 15 milliGRAM(s) Oral at bedtime  OLANZapine 5 milliGRAM(s) Oral two times a day  OXcarbazepine 300 milliGRAM(s) Oral two times a day  polyethylene glycol 3350 17 Gram(s) Oral daily  senna 2 Tablet(s) Oral at bedtime    --------------------------------------------------------------------------------------------------  Study Interpretation:    [[[Abbreviation Key:  PDR=alpha rhythm/posterior dominant rhythm. A-P=anterior posterior gradient.  Amplitude: ‘very low’:<20; ‘low’:20-50; ‘medium’:; ‘high’:>200uV.  Persistence for periodic/rhythmic patterns (% of epoch) ‘rare’:<1%; ‘occasional’:1-10%; ‘frequent’:10-50%; ‘abundant’:50-90%; ‘continuous’:>90%.  Persistence for sporadic discharges: ‘rare’:<1/hr; ‘occasional’:1/min-1/hr; ‘frequent’:>1/min; ‘abundant’:>1/10 sec.  GRDA=generalized rhythmic delta activity, LRDA=lateralized rhythmic delta activity, TIRDA=temporal intermittent rhythmic delta activity, FIRDA=frontal intermittent rhythmic activity. LPD=PLED=lateralized periodic discharges, GPD=generalized periodic discharges, BiPDs=BiPLEDs=bilateral independent periodic epileptiform discharges, SIRPID=stimulus induced rhythmic, periodic, or ictal appearing discharges.  Modifiers: +F=with fast component, +S=with spike component, +R=with rhythmic component.  S-B=burst suppression pattern.  Max=maximal. N1-drowsy, N2-stage II sleep, N3-slow wave sleep.  HV=hyperventilation, PS=photic stimulation]]]    FINDINGS:  The background was continuous, spontaneously variable and reactive.  During limited wakefulness, the posteriorly dominant rhythm was not seen.      There was more diffuse irregular theta and polymorphic delta activity present.    Sleep Background:  Drowsiness was characterized by background slowing.   Stage II transients were characterized by sleep spindles and K complexes at times sharply contoured likely resembling dyshormia.     Non-Epileptiform Activity:   Diffuse beta activity.     Epileptiform Activity:   None.    Events:  No clinical events were recorded.  No seizures were recorded.    Activation Procedures:   -Hyperventilation was not performed.    -Photic stimulation was performed and did not elicit any abnormalities.      Artifacts:  Intermittent myogenic and movement artifacts were noted.    ECG:  The heart rate on single channel ECG at baseline was predominantly near BPM = 80-90  -----------------------------------------------------------------------------------------------------    EEG Classification / Summary:  Abnormal EEG study in an altered patient.   Sharply contoured K complexes, likely dyshormia.  Diffuse beta activity.   Mild to moderate background slowing    -----------------------------------------------------------------------------------------------------    Clinical Impression:  Mild to moderate diffuse or multifocal cerebral dysfunction, not specific as to etiology.  Diffuse beta activity secondary to medication effect such as benzodiazepines or barbiturates.   There were no seizures recorded.     Sharply contoured waveforms and recording mainly in sleep, if clinically warranted can repeat study for further classification.     -------------------------------------------------------------------------------------------------------  Vale Carlton DO  Epilepsy Fellow, [facility]ile[facility] [place] CLIFTON MCDERMOTT [id number]     Study Date: 		04-23-20    ROUTINE EEG    Technical Information:			  		  Placement and Labeling of Electrodes:  The EEG was performed utilizing 20 channels referential EEG connections (coronal over temporal over parasagittal montage) using all standard 10-20 electrode placements with EKG.  Recording was at a sampling rate of 256 samples per second per channel.  Time synchronized digital video recording was done simultaneously with EEG recording.  A low light infrared camera was used for low light recording.  Chucho and seizure detection algorithms were utilized.    CSA Technical Component:  Quantitative EEG analysis using a separate Compressed Spectral Array (CSA) software package was conducted in real-time and run at bedside after set up by the technician, digitally displaying the power of electrographic frequencies included in the 1-30Hz band using a graded color map.  This data was reviewed and interpreted independently, and is reported in a separate section below.    --------------------------------------------------------------------------------------------------  History:  CC/ HPI Patient is a 65y old  Female who presents with a chief complaint of hypoxia (23 Apr 2020 14:03)    MEDICATIONS  (STANDING):  cinacalcet 30 milliGRAM(s) Oral two times a day  dextrose 5%. 1000 milliLiter(s) (70 mL/Hr) IV Continuous <Continuous>  enoxaparin Injectable 40 milliGRAM(s) SubCutaneous daily  hydroxychloroquine   Oral   hydroxychloroquine 400 milliGRAM(s) Oral every 24 hours  levothyroxine 75 MICROGram(s) Oral daily  LORazepam     Tablet 0.5 milliGRAM(s) Oral two times a day  mirtazapine 15 milliGRAM(s) Oral at bedtime  OLANZapine 5 milliGRAM(s) Oral two times a day  OXcarbazepine 300 milliGRAM(s) Oral two times a day  polyethylene glycol 3350 17 Gram(s) Oral daily  senna 2 Tablet(s) Oral at bedtime    --------------------------------------------------------------------------------------------------  Study Interpretation:    [[[Abbreviation Key:  PDR=alpha rhythm/posterior dominant rhythm. A-P=anterior posterior gradient.  Amplitude: ‘very low’:<20; ‘low’:20-50; ‘medium’:; ‘high’:>200uV.  Persistence for periodic/rhythmic patterns (% of epoch) ‘rare’:<1%; ‘occasional’:1-10%; ‘frequent’:10-50%; ‘abundant’:50-90%; ‘continuous’:>90%.  Persistence for sporadic discharges: ‘rare’:<1/hr; ‘occasional’:1/min-1/hr; ‘frequent’:>1/min; ‘abundant’:>1/10 sec.  GRDA=generalized rhythmic delta activity, LRDA=lateralized rhythmic delta activity, TIRDA=temporal intermittent rhythmic delta activity, FIRDA=frontal intermittent rhythmic activity. LPD=PLED=lateralized periodic discharges, GPD=generalized periodic discharges, BiPDs=BiPLEDs=bilateral independent periodic epileptiform discharges, SIRPID=stimulus induced rhythmic, periodic, or ictal appearing discharges.  Modifiers: +F=with fast component, +S=with spike component, +R=with rhythmic component.  S-B=burst suppression pattern.  Max=maximal. N1-drowsy, N2-stage II sleep, N3-slow wave sleep.  HV=hyperventilation, PS=photic stimulation]]]    FINDINGS:  The background was continuous, spontaneously variable and reactive.  During limited wakefulness, the posteriorly dominant rhythm was not seen.      There was more diffuse irregular theta and polymorphic delta activity present.    Sleep Background:  Drowsiness was characterized by background slowing.   Stage II transients were characterized by sleep spindles and K complexes at times sharply contoured likely resembling dyshormia.     Non-Epileptiform Activity:   Diffuse beta activity.     Epileptiform Activity:   None.    Events:  No clinical events were recorded.  No seizures were recorded.    Activation Procedures:   -Hyperventilation was not performed.    -Photic stimulation was performed and did not elicit any abnormalities.      Artifacts:  Intermittent myogenic and movement artifacts were noted.    ECG:  The heart rate on single channel ECG at baseline was predominantly near BPM = 80-90  -----------------------------------------------------------------------------------------------------    EEG Classification / Summary:  Abnormal EEG study in an altered patient.   Sharply contoured K complexes, potentially dyshormia.  Diffuse beta activity.   Mild to moderate background slowing    -----------------------------------------------------------------------------------------------------    Clinical Impression:  Mild to moderate diffuse or multifocal cerebral dysfunction, not specific as to etiology.  Diffuse beta activity secondary to medication effect such as benzodiazepines or barbiturates.   There were no seizures recorded.     Sharply contoured waveforms and recording mainly in sleep, more likely a variant of sleep architecture but if clinically warranted can repeat study for further classification.     -------------------------------------------------------------------------------------------------------  Vale Carlton DO  Epilepsy Fellow, [facility]ile[facility] [place]

## 2020-04-23 NOTE — CONSULT NOTE ADULT - SUBJECTIVE AND OBJECTIVE BOX
CHIEF COMPLAINT: twitching    HPI: [id number]  History of Present Illness: 	  66 yo F w/ hx bipolar disorder, intermittently nonverbal presents to ER for persistent fevers and new hypoxia (80's on room air) since being diagnosed with COVID week prior as outpatient.  per aide at bedside patient noted to have poor PO intake, poor urine output and fevers/hypoxia. been checking pulse ox intermittently. denies nausea/vomiting or diarrhea. +BM's.   at baseline, ambulates and able to feed self.   ros unable to obtain as patient not responding to questions/commands    in Er , hypoxic at 85% RA and improved with 100% o2      Asked to see patient for continued decreased responsiveness and twitching.  r/o seizure, NMS, myoclonus      PAST MEDICAL & SURGICAL HISTORY:  Hypothyroid  Bipolar 1 disorder  No significant past surgical history    MEDICATIONS  (STANDING):  cinacalcet 30 milliGRAM(s) Oral two times a day  dextrose 5%. 1000 milliLiter(s) (70 mL/Hr) IV Continuous <Continuous>  enoxaparin Injectable 40 milliGRAM(s) SubCutaneous daily  hydroxychloroquine   Oral   hydroxychloroquine 400 milliGRAM(s) Oral every 24 hours  levothyroxine 75 MICROGram(s) Oral daily  LORazepam     Tablet 0.5 milliGRAM(s) Oral three times a day  LORazepam   Injectable 0.5 milliGRAM(s) IV Push once  mirtazapine 15 milliGRAM(s) Oral at bedtime  OLANZapine 5 milliGRAM(s) Oral two times a day  OXcarbazepine 300 milliGRAM(s) Oral two times a day  polyethylene glycol 3350 17 Gram(s) Oral daily  senna 2 Tablet(s) Oral at bedtime    MEDICATIONS  (PRN):  acetaminophen  Suppository .. 650 milliGRAM(s) Rectal every 6 hours PRN Temp greater or equal to 38C (100.4F), Mild Pain (1 - 3), Moderate Pain (4 - 6)  ALBUTerol    90 MICROgram(s) HFA Inhaler 2 Puff(s) Inhalation every 6 hours PRN Shortness of Breath and/or Wheezing  bisacodyl Suppository 10 milliGRAM(s) Rectal daily PRN Constipation  guaiFENesin   Syrup  (Sugar-Free) 200 milliGRAM(s) Oral every 6 hours PRN Cough    Allergies    Depakote (Hives)  lithium (Hives)    Intolerances        FAMILY HISTORY:  No pertinent family history in first degree relatives          SOCIAL HISTORY:    Tobacco:    Alcohol:    Drugs:          REVIEW OF SYSTEMS:    Relevant systems are negative except as noted in the chart, HPI, and PMH      VITAL SIGNS:  Vital Signs Last 24 Hrs  T(C): 38.7 (23 Apr 2020 08:42), Max: 38.7 (23 Apr 2020 08:42)  T(F): 101.6 (23 Apr 2020 08:42), Max: 101.6 (23 Apr 2020 08:42)  HR: 88 (23 Apr 2020 08:42) (78 - 90)  BP: 133/72 (23 Apr 2020 08:42) (117/82 - 133/75)  BP(mean): --  RR: 19 (23 Apr 2020 08:42) (18 - 19)  SpO2: 91% (23 Apr 2020 05:02) (91% - 97%)    PHYSICAL EXAMINATION:    General: Well-developed, well nourished, in no acute distress.  Cardiac:  Regular rate and rhythm. No carotid bruits appreciated.  Eyes: Fundoscopic examination was deferred.  Neurologic:  - Mental Status:  Alert, awake, oriented to person, place, and time; Speech is fluent. Language is normal. Follows commands well.  Insight and knowledge appear appropriate.  Cranial Nerves II-XII:    II:  Visual acuity is normal for age ; Visual fields are full to confrontation; Pupils are equal, round, and reactive to light.  III, IV, VI:  Extraocular movements are intact without nystagmus.  V:  Facial sensation is intact in the V1-V3 distribution bilaterally.  VII:  Face is symmetric with normal eye closure and smile  VIII:  Hearing is grossly intact  IX, X, XII:  speech is clear  XI:  Head turning and shoulder shrug are intact.  - Motor:  Strength is 5/5 x 4.   There is no pronator drift. .  - Reflexes:  2+ and symmetric at the knees.  Plantar responses flexor.  - Sensory:  Symmetric to light touch  - Coordination:  Finger-nose-finger is normal. Rapid alternating hand and foot  movements are intact. Dexterity appears normal      LABS:                          10.3   3.89  )-----------( 367      ( 23 Apr 2020 08:36 )             34.1     23 Apr 2020 08:36    162    |  120    |  20.0   ----------------------------<  104    4.4     |  30.0   |  1.66     Ca    9.5        23 Apr 2020 08:36    Creatine Kinase, Serum: 215 U/L (04.23.20 @ 08:36)                  RADIOLOGY & ADDITIONAL STUDIES:      IMPRESSION:    Twitching likcarson metabolic induced myoclonus due to electrolyte derangements, NMS unlikley - no longer with significant fever and CPKs only slightly elevated.  r/o seizure       PLAN:  1. Medical management of metabolic disturbances  2. CT, EEG  3. Further recommendations to   4.  5. CHIEF COMPLAINT: twitching    HPI: [id number]  History of Present Illness: 	  64 yo F w/ hx bipolar disorder, intermittently nonverbal presents to ER for persistent fevers and new hypoxia (80's on room air) since being diagnosed with COVID week prior as outpatient.  per aide at bedside patient noted to have poor PO intake, poor urine output and fevers/hypoxia. been checking pulse ox intermittently. denies nausea/vomiting or diarrhea. +BM's.   at baseline, ambulates and able to feed self.   ros unable to obtain as patient not responding to questions/commands    in Er , hypoxic at 85% RA and improved with 100% o2      Asked to see patient for continued decreased responsiveness and twitching.  r/o seizure, NMS, myoclonus      PAST MEDICAL & SURGICAL HISTORY:  Hypothyroid  Bipolar 1 disorder  No significant past surgical history    MEDICATIONS  (STANDING):  cinacalcet 30 milliGRAM(s) Oral two times a day  dextrose 5%. 1000 milliLiter(s) (70 mL/Hr) IV Continuous <Continuous>  enoxaparin Injectable 40 milliGRAM(s) SubCutaneous daily  hydroxychloroquine   Oral   hydroxychloroquine 400 milliGRAM(s) Oral every 24 hours  levothyroxine 75 MICROGram(s) Oral daily  LORazepam     Tablet 0.5 milliGRAM(s) Oral three times a day  LORazepam   Injectable 0.5 milliGRAM(s) IV Push once  mirtazapine 15 milliGRAM(s) Oral at bedtime  OLANZapine 5 milliGRAM(s) Oral two times a day  OXcarbazepine 300 milliGRAM(s) Oral two times a day  polyethylene glycol 3350 17 Gram(s) Oral daily  senna 2 Tablet(s) Oral at bedtime    MEDICATIONS  (PRN):  acetaminophen  Suppository .. 650 milliGRAM(s) Rectal every 6 hours PRN Temp greater or equal to 38C (100.4F), Mild Pain (1 - 3), Moderate Pain (4 - 6)  ALBUTerol    90 MICROgram(s) HFA Inhaler 2 Puff(s) Inhalation every 6 hours PRN Shortness of Breath and/or Wheezing  bisacodyl Suppository 10 milliGRAM(s) Rectal daily PRN Constipation  guaiFENesin   Syrup  (Sugar-Free) 200 milliGRAM(s) Oral every 6 hours PRN Cough    Allergies    Depakote (Hives)  lithium (Hives)    Intolerances        FAMILY HISTORY:  No pertinent family history in first degree relatives          SOCIAL HISTORY:    Tobacco:    Alcohol:    Drugs:          REVIEW OF SYSTEMS:    Relevant systems are negative except as noted in the chart, HPI, and PMH      VITAL SIGNS:  Vital Signs Last 24 Hrs  T(C): 38.7 (23 Apr 2020 08:42), Max: 38.7 (23 Apr 2020 08:42)  T(F): 101.6 (23 Apr 2020 08:42), Max: 101.6 (23 Apr 2020 08:42)  HR: 88 (23 Apr 2020 08:42) (78 - 90)  BP: 133/72 (23 Apr 2020 08:42) (117/82 - 133/75)  BP(mean): --  RR: 19 (23 Apr 2020 08:42) (18 - 19)  SpO2: 91% (23 Apr 2020 05:02) (91% - 97%)    PHYSICAL EXAMINATION:    General: Well-developed, well nourished, in no acute distress.  Cardiac:  Regular rate and rhythm. No carotid bruits appreciated.  Eyes: Fundoscopic examination was deferred.  Neurologic:  - Mental Status:  Alert, awake, oriented to person, place, and time; Speech is fluent. Language is normal. Follows commands well.  Insight and knowledge appear appropriate.  Cranial Nerves II-XII:    II:  Visual acuity is normal for age ; Visual fields are full to confrontation; Pupils are equal, round, and reactive to light.  III, IV, VI:  Extraocular movements are intact without nystagmus.  V:  Facial sensation is intact in the V1-V3 distribution bilaterally.  VII:  Face is symmetric with normal eye closure and smile  VIII:  Hearing is grossly intact  IX, X, XII:  speech is clear  XI:  Head turning and shoulder shrug are intact.  - Motor:  Strength is 5/5 x 4.   There is no pronator drift. .  - Reflexes:  2+ and symmetric at the knees.  Plantar responses flexor.  - Sensory:  Symmetric to light touch  - Coordination:  Finger-nose-finger is normal. Rapid alternating hand and foot  movements are intact. Dexterity appears normal      LABS:                          10.3   3.89  )-----------( 367      ( 23 Apr 2020 08:36 )             34.1     23 Apr 2020 08:36    162    |  120    |  20.0   ----------------------------<  104    4.4     |  30.0   |  1.66     Ca    9.5        23 Apr 2020 08:36    Creatine Kinase, Serum: 215 U/L (04.23.20 @ 08:36)                  RADIOLOGY & ADDITIONAL STUDIES:      Clinical Impression:  Potential epileptogenic focus in the left frontotemporal region.   Moderate to severe diffuse or multifocal cerebral dysfunction, not specific as to etiology.  Diffuse beta activity secondary to medication effect such as benzodiazepines or barbiturates.   There were no seizures recorded.        IMPRESSION:    Twitching likely metabolic induced myoclonus due to electrolyte derangements, NMS unlikely - no longer with significant fever and CPKs only slightly elevated.  Left temporal epileptiognic focus but no actual seizure activity  < from: CT Head No Cont (04.23.20 @ 16:23) >      FINDINGS:       There is no evidence of intraparenchymal or extraaxial hemorrhage.   There is no CT evidence of large vessel acute infarct. No mass effect is found in the brain.  No evidence of midline shift or herniation pattern.    The ventricles, sulci and basal cisterns appear unremarkable.    Visualized paranasal sinuses are clear.        < end of copied text >    PLAN:  1. Medical management of metabolic disturbances  2. Continue Trileptal 300mgh BID  3. No indication for NMS treatment- dantrolene, bromocriptine etc  4.  5. CHIEF COMPLAINT: twitching    HPI: [id number]  History of Present Illness: 	  66 yo F w/ hx bipolar disorder, intermittently nonverbal presents to ER for persistent fevers and new hypoxia (80's on room air) since being diagnosed with COVID week prior as outpatient.  per aide at bedside patient noted to have poor PO intake, poor urine output and fevers/hypoxia. been checking pulse ox intermittently. denies nausea/vomiting or diarrhea. +BM's.   at baseline, ambulates and able to feed self.   ros unable to obtain as patient not responding to questions/commands    in Er , hypoxic at 85% RA and improved with 100% o2      Asked to see patient for continued decreased responsiveness and twitching.  r/o seizure, NMS, myoclonus      PAST MEDICAL & SURGICAL HISTORY:  Hypothyroid  Bipolar 1 disorder  No significant past surgical history    MEDICATIONS  (STANDING):  cinacalcet 30 milliGRAM(s) Oral two times a day  dextrose 5%. 1000 milliLiter(s) (70 mL/Hr) IV Continuous <Continuous>  enoxaparin Injectable 40 milliGRAM(s) SubCutaneous daily  hydroxychloroquine   Oral   hydroxychloroquine 400 milliGRAM(s) Oral every 24 hours  levothyroxine 75 MICROGram(s) Oral daily  LORazepam     Tablet 0.5 milliGRAM(s) Oral three times a day  LORazepam   Injectable 0.5 milliGRAM(s) IV Push once  mirtazapine 15 milliGRAM(s) Oral at bedtime  OLANZapine 5 milliGRAM(s) Oral two times a day  OXcarbazepine 300 milliGRAM(s) Oral two times a day  polyethylene glycol 3350 17 Gram(s) Oral daily  senna 2 Tablet(s) Oral at bedtime    MEDICATIONS  (PRN):  acetaminophen  Suppository .. 650 milliGRAM(s) Rectal every 6 hours PRN Temp greater or equal to 38C (100.4F), Mild Pain (1 - 3), Moderate Pain (4 - 6)  ALBUTerol    90 MICROgram(s) HFA Inhaler 2 Puff(s) Inhalation every 6 hours PRN Shortness of Breath and/or Wheezing  bisacodyl Suppository 10 milliGRAM(s) Rectal daily PRN Constipation  guaiFENesin   Syrup  (Sugar-Free) 200 milliGRAM(s) Oral every 6 hours PRN Cough    Allergies    Depakote (Hives)  lithium (Hives)    Intolerances        FAMILY HISTORY:  No pertinent family history in first degree relatives          SOCIAL HISTORY:    Tobacco:    Alcohol:    Drugs:          REVIEW OF SYSTEMS:    Relevant systems are negative except as noted in the chart, HPI, and PMH      VITAL SIGNS:  Vital Signs Last 24 Hrs  T(C): 38.7 (23 Apr 2020 08:42), Max: 38.7 (23 Apr 2020 08:42)  T(F): 101.6 (23 Apr 2020 08:42), Max: 101.6 (23 Apr 2020 08:42)  HR: 88 (23 Apr 2020 08:42) (78 - 90)  BP: 133/72 (23 Apr 2020 08:42) (117/82 - 133/75)  BP(mean): --  RR: 19 (23 Apr 2020 08:42) (18 - 19)  SpO2: 91% (23 Apr 2020 05:02) (91% - 97%)    PHYSICAL EXAMINATION:    General: Frail in moderate distrees T- 103.5  Cardiac:  Regular rate and rhythm. No carotid bruits appreciated.  Eyes: Fundoscopic examination was deferred.  Neurologic:  - Mental Status:  awake, Poorly interactive.  Does not follow commands but asks for water.  Cranial Nerves II-XII:    II:  Visual acuity is normal for age ; Visual fields are full to threat Pupils are equal, round, and reactive to light.  III, IV, VI:  Extraocular movements are intact without nystagmus.    Motor- limited power exam.  Flapping tremors of the BUE and Right foot.  - parkinsonian.    Some rigors perhaps as well           LABS:                          10.3   3.89  )-----------( 367      ( 23 Apr 2020 08:36 )             34.1     23 Apr 2020 08:36    162    |  120    |  20.0   ----------------------------<  104    4.4     |  30.0   |  1.66     Ca    9.5        23 Apr 2020 08:36    Creatine Kinase, Serum: 215 U/L (04.23.20 @ 08:36)                  RADIOLOGY & ADDITIONAL STUDIES:      Clinical Impression:  Potential epileptogenic focus in the left frontotemporal region.   Moderate to severe diffuse or multifocal cerebral dysfunction, not specific as to etiology.  Diffuse beta activity secondary to medication effect such as benzodiazepines or barbiturates.   There were no seizures recorded.        IMPRESSION:    Twitching likely metabolic induced myoclonus due to electrolyte derangements,  The flapping movments may be related to high fever and unmasking of parkinsonian side effects of prolonged ant-psychotic med exposure   NMS unlikely - and CPKs only slightly elevated. Not rigid  Left temporal epileptiognic focus but no actual seizure activity  < from: CT Head No Cont (04.23.20 @ 16:23) >      FINDINGS:       There is no evidence of intraparenchymal or extraaxial hemorrhage.   There is no CT evidence of large vessel acute infarct. No mass effect is found in the brain.  No evidence of midline shift or herniation pattern.    The ventricles, sulci and basal cisterns appear unremarkable.    Visualized paranasal sinuses are clear.        < end of copied text >    PLAN:  1. Medical management of metabolic disturbances  2. Continue Trileptal 300mgh BID  3. No indication for NMS treatment- dantrolene, bromocriptine etc  4. Consider adding cogentin for tremors or stopping anti-psychotics--psych eval suggested  5.

## 2020-04-23 NOTE — BEHAVIORAL HEALTH ASSESSMENT NOTE - HPI (INCLUDE ILLNESS QUALITY, SEVERITY, DURATION, TIMING, CONTEXT, MODIFYING FACTORS, ASSOCIATED SIGNS AND SYMPTOMS)
Patient is a 64 y/o  female, domiciled alone with 24 hr HHA supervision, PMH hypothyroid, kidney disease, PPH of bipolar disorder, multiple inpatient hospitalizations ( last 2 years ago [facility]), no known suicide attempts, substance abuse or violence, in current treatment with Dr. Geo Melendrez [phone], admitted to [facility] for change in mental status, not eating, hypoxia, in the context of covid positive. Psychiatry consulted for medication recommendations.  Patient seen via telepsychiatry. She was observed nonverbal, staring while lying in bed. Patient did not respond to simple commands. Patient is febrile and requires oxygen.   Received collateral from daughter who reports patient mental status started to deteriorate over the past week in the context of receiving positive covid results. Prior to viral infection patient was " stable."  She was diagnosed with bipolar disorder over 40 years ago and was stabilized on Lithium for many years. Lithium was discontinued due to ALVARO and she has been on current regimen x 2 years. Patient is in current outpatient treatment with Dr. Geo Melendrez with whom she see [id number]. At baseline patient was able to engage in conversation, spoke with her daughter daily but required total assistance with ADL's and has had 24/7 A assistance. Daughter reports patient began displaying symptoms of dementia several years ago however work up was negative. Patient has had episodes of being nonverbal which is transient. She has had trial of several medications and has been stable on current regimen for 2 years. Antipsychotics were started years ago for paranoid delusions. Patient has had hand tremor for 3-4 years.

## 2020-04-23 NOTE — PROGRESS NOTE ADULT - ASSESSMENT
66 yo F w/ hx bipolar disorder, intermittently nonverbal presents to ER for persistent fevers and new hypoxia (80's on room air) since being diagnosed with COVID week prior as outpatient. In the ED patient as noted to be hypoxic at 85% RA and improved with 100% o2 with 4 L NC.     #acute metabolic/toxic encephalopathy - may be secondary to catatonia however also with hypernatremia  - treat possible underlying causes - correct sodium  - obtain CT head and EEG  - will consult neurology for possible seizure like activity vs NMS from psych medications - check CPK  - will consult for psychiatry - if metabolic causes ruled out may have to treat catatonia  - she is on ativan BID orally  - will restart - IV dose given today  - NGT placed with daughter consent to replace electrolytes and feed    #acute hypoxic resp failure due to COVID19  - plaquenil day 4/5, o2 as tolerated  - antitussives, antipyretics  - trend inflammatory markers, if worsening start steroids  - so far has been remaining stable  - her aide service at her private apartment will resume after 14 days of quarantine - will confirm with CM  - when medically improved can have her see PT    #ALVARO vs CKD with hypernatremia - likely CKD per daughter history; lithium induced  - ALVARO improved  - hypernatremia likely from poor PO intake  - no suspected diabetes insipidus but will follow ins/outs  - c/w IV fluids  - NGT placed - free water started at 300 q4 hours  - repeat BMP in the morning  - follow up BMP this morning    #bipolar disorder: continue home meds; for years was on lithium which controlled symptoms but was stopped due to kidney damage; she has struggled to treat bipolar since then  - sertraline, oxcarbazepine, olanzapine, remeron  - lithium level low on this admission  - discussed with psychiatry - continue sertraline for now  - start ativan BID PO    #hypothyroid: synthroid    #Advanced directives; currently full code after admitting physician discussed with daughter HCP Arcelia [phone]; course updated with Arcelia this morning    #DVT ppx - lovenox daily

## 2020-04-23 NOTE — BEHAVIORAL HEALTH ASSESSMENT NOTE - NSBHCHARTREVIEWVS_PSY_A_CORE FT
ICU Vital Signs Last 24 Hrs  T(C): 38.7 (23 Apr 2020 08:42), Max: 38.7 (23 Apr 2020 08:42)  T(F): 101.6 (23 Apr 2020 08:42), Max: 101.6 (23 Apr 2020 08:42)  HR: 88 (23 Apr 2020 08:42) (78 - 90)  BP: 133/72 (23 Apr 2020 08:42) (117/82 - 133/75)  BP(mean): --  ABP: --  ABP(mean): --  RR: 19 (23 Apr 2020 08:42) (18 - 19)  SpO2: 91% (23 Apr 2020 05:02) (91% - 97%)

## 2020-04-23 NOTE — BEHAVIORAL HEALTH ASSESSMENT NOTE - SUMMARY
Patient is a 64 y/o  female, domiciled alone with 24 hr HHA supervision, PMH hypothyroid, kidney disease, PPH of bipolar disorder, multiple inpatient hospitalizations ( last 2 years ago [facility]), no known suicide attempts, substance abuse or violence, in current treatment with Dr. Geo Melendrez [phone], admitted to [facility] for change in mental status, not eating, hypoxia, in the context of covid positive. Psychiatry consulted for medication recommendations. Patient appears alert and non verbal with psychomotor retardation and chronic hand tremor. History from daughter reports patient was psychiatrically stable prior to covid infection. Current change in mental status is an acute change from baseline suggesting delirium   Recommend  1. Continue Zyprexa 5mg bid  2.  Continue Remeron 15 mg hs  3. Continue Ativan to 0.5 mg bid- home dose  4.  Continue Trileptal 300 mg bid

## 2020-04-23 NOTE — BEHAVIORAL HEALTH ASSESSMENT NOTE - OTHER
unable to assess bed rest nonverbal nonverbal, unable to assess daughter denies patient has verbalized suicidal ideation

## 2020-04-23 NOTE — BEHAVIORAL HEALTH ASSESSMENT NOTE - NSBHCHARTREVIEWLAB_PSY_A_CORE FT
10.3   3.89  )-----------( 367      ( 2020 08:36 )             34.1     04-23    162<HH>  |  120<H>  |  20.0  ----------------------------<  104<H>  4.4   |  30.0<H>  |  1.66<H>    Ca    9.5      2020 08:36          Urinalysis Basic - ( 2020 00:59 )    Color: Yellow / Appearance: Clear / S.005 / pH: x  Gluc: x / Ketone: Negative  / Bili: Negative / Urobili: Negative mg/dL   Blood: x / Protein: 15 mg/dL / Nitrite: Negative   Leuk Esterase: Negative / RBC: 0-2 /HPF / WBC Negative   Sq Epi: x / Non Sq Epi: Occasional / Bacteria: x

## 2020-04-23 NOTE — BEHAVIORAL HEALTH ASSESSMENT NOTE - NSBHCHARTREVIEWINVESTIGATE_PSY_A_CORE FT
< from: 12 Lead ECG (04.20.20 @ 16:35) >    Ventricular Rate 72 BPM    Atrial Rate 72 BPM    P-R Interval 170 ms    QRS Duration 94 ms    Q-T Interval 394 ms    QTC Calculation(Bezet) 431 ms    P Axis 33 degrees    R Axis 29 degrees    T Axis 34 degrees    < end of copied text >

## 2020-04-23 NOTE — PROGRESS NOTE ADULT - SUBJECTIVE AND OBJECTIVE BOX
CC: hypoxia (20 Apr 2020 18:00)    INTERVAL HPI/OVERNIGHT EVENTS:  This morning still not verbal  patient not eating with assistance - food is remaining in mouth  today she is less responsive and appears to have myclonic jerking motion  discussed with daughter that due to sodium being elevated she will need feeding tube  she agrees with course so far    Vital Signs Last 24 Hrs  T(C): 37.3 (22 Apr 2020 09:50), Max: 37.3 (22 Apr 2020 09:50)  T(F): 99.2 (22 Apr 2020 09:50), Max: 99.2 (22 Apr 2020 09:50)  HR: 92 (22 Apr 2020 09:50) (92 - 94)  BP: 122/60 (22 Apr 2020 09:50) (122/60 - 127/76)  BP(mean): --  RR: 16 (22 Apr 2020 09:50) (16 - 18)  SpO2: 95% (22 Apr 2020 09:50) (95% - 97%)    PHYSICAL EXAM:  General: in no acute distress; less responsive appears catatonic, resting tremor of the upper extremities and now lower extremities noted  ENMT: No nasal discharge; airway clear; edentulous; NC in place  Gastrointestinal: Soft non-tender non-distended; Normal bowel sounds  Extremities: Normal range of motion, No clubbing, cyanosis or edema  Vascular: Peripheral pulses palpable 2+ bilaterally  Neurological: Alert at times during exam and resists noxious stimuli    I&O's Detail             04-23    162<HH>  |  120<H>  |  20.0  ----------------------------<  104<H>  4.4   |  30.0<H>  |  1.66<H>    Ca    9.5      23 Apr 2020 08:36                          10.3   3.89  )-----------( 367      ( 23 Apr 2020 08:36 )             34.1     MEDICATIONS  (STANDING):  cinacalcet 30 milliGRAM(s) Oral two times a day  dextrose 5%. 1000 milliLiter(s) (70 mL/Hr) IV Continuous <Continuous>  enoxaparin Injectable 40 milliGRAM(s) SubCutaneous daily  hydroxychloroquine   Oral   hydroxychloroquine 400 milliGRAM(s) Oral every 24 hours  levothyroxine 75 MICROGram(s) Oral daily  LORazepam     Tablet 0.5 milliGRAM(s) Oral three times a day  LORazepam   Injectable 0.5 milliGRAM(s) IV Push once  mirtazapine 15 milliGRAM(s) Oral at bedtime  OLANZapine 5 milliGRAM(s) Oral two times a day  OXcarbazepine 300 milliGRAM(s) Oral two times a day  polyethylene glycol 3350 17 Gram(s) Oral daily  senna 2 Tablet(s) Oral at bedtime    MEDICATIONS  (PRN):  acetaminophen  Suppository .. 650 milliGRAM(s) Rectal every 6 hours PRN Temp greater or equal to 38C (100.4F), Mild Pain (1 - 3), Moderate Pain (4 - 6)  ALBUTerol    90 MICROgram(s) HFA Inhaler 2 Puff(s) Inhalation every 6 hours PRN Shortness of Breath and/or Wheezing  bisacodyl Suppository 10 milliGRAM(s) Rectal daily PRN Constipation  guaiFENesin   Syrup  (Sugar-Free) 200 milliGRAM(s) Oral every 6 hours PRN Cough    RADIOLOGY & ADDITIONAL TESTS:

## 2020-04-24 LAB
ALBUMIN SERPL ELPH-MCNC: 3.3 G/DL — SIGNIFICANT CHANGE UP (ref 3.3–5.2)
ALP SERPL-CCNC: 67 U/L — SIGNIFICANT CHANGE UP (ref 40–120)
ALT FLD-CCNC: 10 U/L — SIGNIFICANT CHANGE UP
ANION GAP SERPL CALC-SCNC: 15 MMOL/L — SIGNIFICANT CHANGE UP (ref 5–17)
AST SERPL-CCNC: 19 U/L — SIGNIFICANT CHANGE UP
BASOPHILS # BLD AUTO: 0.02 K/UL — SIGNIFICANT CHANGE UP (ref 0–0.2)
BASOPHILS NFR BLD AUTO: 0.4 % — SIGNIFICANT CHANGE UP (ref 0–2)
BILIRUB SERPL-MCNC: 0.2 MG/DL — LOW (ref 0.4–2)
BUN SERPL-MCNC: 31 MG/DL — HIGH (ref 8–20)
CALCIUM SERPL-MCNC: 9.6 MG/DL — SIGNIFICANT CHANGE UP (ref 8.6–10.2)
CHLORIDE SERPL-SCNC: 117 MMOL/L — HIGH (ref 98–107)
CK MB CFR SERPL CALC: 1.3 NG/ML — SIGNIFICANT CHANGE UP (ref 0–6.7)
CK SERPL-CCNC: 166 U/L — SIGNIFICANT CHANGE UP (ref 25–170)
CO2 SERPL-SCNC: 29 MMOL/L — SIGNIFICANT CHANGE UP (ref 22–29)
CREAT SERPL-MCNC: 1.87 MG/DL — HIGH (ref 0.5–1.3)
EOSINOPHIL # BLD AUTO: 0.01 K/UL — SIGNIFICANT CHANGE UP (ref 0–0.5)
EOSINOPHIL NFR BLD AUTO: 0.2 % — SIGNIFICANT CHANGE UP (ref 0–6)
GLUCOSE SERPL-MCNC: 101 MG/DL — HIGH (ref 70–99)
HCT VFR BLD CALC: 35.7 % — SIGNIFICANT CHANGE UP (ref 34.5–45)
HGB BLD-MCNC: 10.6 G/DL — LOW (ref 11.5–15.5)
IMM GRANULOCYTES NFR BLD AUTO: 0.9 % — SIGNIFICANT CHANGE UP (ref 0–1.5)
LYMPHOCYTES # BLD AUTO: 0.77 K/UL — LOW (ref 1–3.3)
LYMPHOCYTES # BLD AUTO: 13.9 % — SIGNIFICANT CHANGE UP (ref 13–44)
MAGNESIUM SERPL-MCNC: 2.3 MG/DL — SIGNIFICANT CHANGE UP (ref 1.6–2.6)
MCHC RBC-ENTMCNC: 29.7 GM/DL — LOW (ref 32–36)
MCHC RBC-ENTMCNC: 30.5 PG — SIGNIFICANT CHANGE UP (ref 27–34)
MCV RBC AUTO: 102.6 FL — HIGH (ref 80–100)
MONOCYTES # BLD AUTO: 0.28 K/UL — SIGNIFICANT CHANGE UP (ref 0–0.9)
MONOCYTES NFR BLD AUTO: 5 % — SIGNIFICANT CHANGE UP (ref 2–14)
NEUTROPHILS # BLD AUTO: 4.42 K/UL — SIGNIFICANT CHANGE UP (ref 1.8–7.4)
NEUTROPHILS NFR BLD AUTO: 79.6 % — HIGH (ref 43–77)
OSMOLALITY UR: 262 MOSM/KG — LOW (ref 300–1000)
PLATELET # BLD AUTO: 342 K/UL — SIGNIFICANT CHANGE UP (ref 150–400)
POTASSIUM SERPL-MCNC: 4.7 MMOL/L — SIGNIFICANT CHANGE UP (ref 3.5–5.3)
POTASSIUM SERPL-SCNC: 4.7 MMOL/L — SIGNIFICANT CHANGE UP (ref 3.5–5.3)
PROCALCITONIN SERPL-MCNC: 0.24 NG/ML — HIGH (ref 0.02–0.1)
PROLACTIN SERPL-MCNC: 48.3 NG/ML — HIGH (ref 3.4–24.1)
PROT SERPL-MCNC: 6.7 G/DL — SIGNIFICANT CHANGE UP (ref 6.6–8.7)
RBC # BLD: 3.48 M/UL — LOW (ref 3.8–5.2)
RBC # FLD: 14.6 % — HIGH (ref 10.3–14.5)
SODIUM SERPL-SCNC: 161 MMOL/L — CRITICAL HIGH (ref 135–145)
SODIUM UR-SCNC: <30 MMOL/L — SIGNIFICANT CHANGE UP
WBC # BLD: 5.55 K/UL — SIGNIFICANT CHANGE UP (ref 3.8–10.5)
WBC # FLD AUTO: 5.55 K/UL — SIGNIFICANT CHANGE UP (ref 3.8–10.5)

## 2020-04-24 PROCEDURE — 99223 1ST HOSP IP/OBS HIGH 75: CPT | Mod: CS

## 2020-04-24 PROCEDURE — 99233 SBSQ HOSP IP/OBS HIGH 50: CPT | Mod: CS

## 2020-04-24 RX ORDER — PREGABALIN 225 MG/1
1000 CAPSULE ORAL DAILY
Refills: 0 | Status: DISCONTINUED | OUTPATIENT
Start: 2020-04-24 | End: 2020-05-05

## 2020-04-24 RX ORDER — BENZTROPINE MESYLATE 1 MG
0.5 TABLET ORAL
Refills: 0 | Status: DISCONTINUED | OUTPATIENT
Start: 2020-04-24 | End: 2020-05-05

## 2020-04-24 RX ORDER — SODIUM CHLORIDE 9 MG/ML
1000 INJECTION, SOLUTION INTRAVENOUS
Refills: 0 | Status: DISCONTINUED | OUTPATIENT
Start: 2020-04-24 | End: 2020-04-25

## 2020-04-24 RX ADMIN — OLANZAPINE 5 MILLIGRAM(S): 15 TABLET, FILM COATED ORAL at 16:20

## 2020-04-24 RX ADMIN — PREGABALIN 1000 MICROGRAM(S): 225 CAPSULE ORAL at 10:04

## 2020-04-24 RX ADMIN — CINACALCET 30 MILLIGRAM(S): 30 TABLET, FILM COATED ORAL at 05:01

## 2020-04-24 RX ADMIN — OXCARBAZEPINE 300 MILLIGRAM(S): 300 TABLET, FILM COATED ORAL at 05:01

## 2020-04-24 RX ADMIN — Medication 400 MILLIGRAM(S): at 16:20

## 2020-04-24 RX ADMIN — SENNA PLUS 2 TABLET(S): 8.6 TABLET ORAL at 21:32

## 2020-04-24 RX ADMIN — OLANZAPINE 5 MILLIGRAM(S): 15 TABLET, FILM COATED ORAL at 05:01

## 2020-04-24 RX ADMIN — MIRTAZAPINE 15 MILLIGRAM(S): 45 TABLET, ORALLY DISINTEGRATING ORAL at 21:32

## 2020-04-24 RX ADMIN — Medication 75 MICROGRAM(S): at 05:00

## 2020-04-24 RX ADMIN — Medication 0.5 MILLIGRAM(S): at 05:00

## 2020-04-24 RX ADMIN — ENOXAPARIN SODIUM 40 MILLIGRAM(S): 100 INJECTION SUBCUTANEOUS at 10:04

## 2020-04-24 RX ADMIN — Medication 650 MILLIGRAM(S): at 02:50

## 2020-04-24 RX ADMIN — POLYETHYLENE GLYCOL 3350 17 GRAM(S): 17 POWDER, FOR SOLUTION ORAL at 10:04

## 2020-04-24 RX ADMIN — CINACALCET 30 MILLIGRAM(S): 30 TABLET, FILM COATED ORAL at 16:20

## 2020-04-24 RX ADMIN — Medication 0.5 MILLIGRAM(S): at 16:20

## 2020-04-24 RX ADMIN — SODIUM CHLORIDE 100 MILLILITER(S): 9 INJECTION, SOLUTION INTRAVENOUS at 18:47

## 2020-04-24 RX ADMIN — OXCARBAZEPINE 300 MILLIGRAM(S): 300 TABLET, FILM COATED ORAL at 16:20

## 2020-04-24 RX ADMIN — SODIUM CHLORIDE 100 MILLILITER(S): 9 INJECTION, SOLUTION INTRAVENOUS at 09:44

## 2020-04-24 NOTE — PROGRESS NOTE ADULT - SUBJECTIVE AND OBJECTIVE BOX
INTERVAL HISTORY:  continues to have high fevers      VITAL SIGNS:  Vital Signs Last 24 Hrs  T(C): 37.7 (24 Apr 2020 08:32), Max: 39.6 (24 Apr 2020 02:48)  T(F): 99.8 (24 Apr 2020 08:32), Max: 103.3 (24 Apr 2020 02:48)  HR: 99 (24 Apr 2020 08:32) (90 - 99)  BP: 140/83 (24 Apr 2020 08:32) (100/59 - 140/83)  BP(mean): --  RR: 19 (24 Apr 2020 08:32) (19 - 20)  SpO2: 94% (23 Apr 2020 23:49) (90% - 96%)    PHYSICAL EXAMINATION:    Mentation:     Language/Speech:  CN:  Visual Fields:  Motor:  Sensory:  DTR:  Babinski:      MEDS:  MEDICATIONS  (STANDING):  benztropine 0.5 milliGRAM(s) Oral two times a day  cinacalcet 30 milliGRAM(s) Oral two times a day  cyanocobalamin 1000 MICROGram(s) Oral daily  dextrose 5%. 1000 milliLiter(s) (100 mL/Hr) IV Continuous <Continuous>  dextrose 5%. 1000 milliLiter(s) (70 mL/Hr) IV Continuous <Continuous>  enoxaparin Injectable 40 milliGRAM(s) SubCutaneous daily  hydroxychloroquine   Oral   hydroxychloroquine 400 milliGRAM(s) Oral every 24 hours  levothyroxine 75 MICROGram(s) Oral daily  LORazepam     Tablet 0.5 milliGRAM(s) Oral two times a day  mirtazapine 15 milliGRAM(s) Oral at bedtime  OLANZapine 5 milliGRAM(s) Oral two times a day  OXcarbazepine 300 milliGRAM(s) Oral two times a day  polyethylene glycol 3350 17 Gram(s) Oral daily  senna 2 Tablet(s) Oral at bedtime    MEDICATIONS  (PRN):  acetaminophen  Suppository .. 650 milliGRAM(s) Rectal every 6 hours PRN Temp greater or equal to 38C (100.4F), Mild Pain (1 - 3), Moderate Pain (4 - 6)  ALBUTerol    90 MICROgram(s) HFA Inhaler 2 Puff(s) Inhalation every 6 hours PRN Shortness of Breath and/or Wheezing  bisacodyl Suppository 10 milliGRAM(s) Rectal daily PRN Constipation  guaiFENesin   Syrup  (Sugar-Free) 200 milliGRAM(s) Oral every 6 hours PRN Cough      LABS:                          10.6   5.55  )-----------( 342      ( 24 Apr 2020 07:03 )             35.7     04-24    161<HH>  |  117<H>  |  31.0<H>  ----------------------------<  101<H>  4.7   |  29.0  |  1.87<H>    Ca    9.6      24 Apr 2020 06:58  Mg     2.3     04-24    TPro  6.7  /  Alb  3.3  /  TBili  0.2<L>  /  DBili  x   /  AST  19  /  ALT  10  /  AlkPhos  67  04-24    LIVER FUNCTIONS - ( 24 Apr 2020 06:58 )  Alb: 3.3 g/dL / Pro: 6.7 g/dL / ALK PHOS: 67 U/L / ALT: 10 U/L / AST: 19 U/L / GGT: x               RADIOLOGY & ADDITIONAL STUDIES:      IMPRESSION & PLAN:    1.COVID+, high fevers  2.Metabolic encephalopathy. Hypernatremia , etc  3.Tremors- - parkinsonian- may be exacerbated by the high fever  4.Epileptiform activity on EEG but no clinical ore EEG seizures- continue trileptal . If she is not taking oral meds would add Keppra 500mgBID IV  5.Bipolar     Will add cogentin .5mg BID- not a pressing issue at this time. however INTERVAL HISTORY:  continues to have high fevers      VITAL SIGNS:  Vital Signs Last 24 Hrs  T(C): 37.7 (24 Apr 2020 08:32), Max: 39.6 (24 Apr 2020 02:48)  T(F): 99.8 (24 Apr 2020 08:32), Max: 103.3 (24 Apr 2020 02:48)  HR: 99 (24 Apr 2020 08:32) (90 - 99)  BP: 140/83 (24 Apr 2020 08:32) (100/59 - 140/83)  BP(mean): --  RR: 19 (24 Apr 2020 08:32) (19 - 20)  SpO2: 94% (23 Apr 2020 23:49) (90% - 96%)    PHYSICAL EXAMINATION:    Mentation:     Language/Speech:  CN:  Visual Fields:  Motor:  Sensory:  DTR:  Babinski:      MEDS:  MEDICATIONS  (STANDING):  benztropine 0.5 milliGRAM(s) Oral two times a day  cinacalcet 30 milliGRAM(s) Oral two times a day  cyanocobalamin 1000 MICROGram(s) Oral daily  dextrose 5%. 1000 milliLiter(s) (100 mL/Hr) IV Continuous <Continuous>  dextrose 5%. 1000 milliLiter(s) (70 mL/Hr) IV Continuous <Continuous>  enoxaparin Injectable 40 milliGRAM(s) SubCutaneous daily  hydroxychloroquine   Oral   hydroxychloroquine 400 milliGRAM(s) Oral every 24 hours  levothyroxine 75 MICROGram(s) Oral daily  LORazepam     Tablet 0.5 milliGRAM(s) Oral two times a day  mirtazapine 15 milliGRAM(s) Oral at bedtime  OLANZapine 5 milliGRAM(s) Oral two times a day  OXcarbazepine 300 milliGRAM(s) Oral two times a day  polyethylene glycol 3350 17 Gram(s) Oral daily  senna 2 Tablet(s) Oral at bedtime    MEDICATIONS  (PRN):  acetaminophen  Suppository .. 650 milliGRAM(s) Rectal every 6 hours PRN Temp greater or equal to 38C (100.4F), Mild Pain (1 - 3), Moderate Pain (4 - 6)  ALBUTerol    90 MICROgram(s) HFA Inhaler 2 Puff(s) Inhalation every 6 hours PRN Shortness of Breath and/or Wheezing  bisacodyl Suppository 10 milliGRAM(s) Rectal daily PRN Constipation  guaiFENesin   Syrup  (Sugar-Free) 200 milliGRAM(s) Oral every 6 hours PRN Cough      LABS:                          10.6   5.55  )-----------( 342      ( 24 Apr 2020 07:03 )             35.7     04-24    161<HH>  |  117<H>  |  31.0<H>  ----------------------------<  101<H>  4.7   |  29.0  |  1.87<H>    Ca    9.6      24 Apr 2020 06:58  Mg     2.3     04-24    TPro  6.7  /  Alb  3.3  /  TBili  0.2<L>  /  DBili  x   /  AST  19  /  ALT  10  /  AlkPhos  67  04-24    LIVER FUNCTIONS - ( 24 Apr 2020 06:58 )  Alb: 3.3 g/dL / Pro: 6.7 g/dL / ALK PHOS: 67 U/L / ALT: 10 U/L / AST: 19 U/L / GGT: x               RADIOLOGY & ADDITIONAL STUDIES:      IMPRESSION & PLAN:    1.COVID+, high fevers  2.Metabolic encephalopathy. Hypernatremia , etc  3.Tremors- - parkinsonian- may be exacerbated by the high fever. Accoridng to psych note, the tremor is chronic and baseline.  4.Epileptiform activity on EEG but no clinical ore EEG seizures- continue trileptal . If she is not taking oral meds would add Keppra 500mgBID IV  5.Catatonic szizophrenia    Will add cogentin .5mg BID- not a pressing issue at this time,however    Will not actively follow.     Please recontact as needed.

## 2020-04-24 NOTE — PROGRESS NOTE ADULT - ASSESSMENT
64 yo F w/ hx bipolar disorder, intermittently nonverbal presents to ER for persistent fevers and new hypoxia (80's on room air) since being diagnosed with COVID week prior as outpatient. In the ED patient as noted to be hypoxic at 85% RA and improved with 100% o2 with 4 L NC.     1. Acute metabolic/toxic encephalopathy   - Multifactorial, hypernatremia, metabolic encephalopathy, possible catatonia    - CT head neg   - EEG with epileptiform activity but no clinical seizure. Continue trileptal, if not taking PO meds can switch to keppra 500mg BID IV  - Neuro following, recs appreciated   - Psych consult appreciated, c/w remeron, ativan, zyprexa, trileptal  - Cogentin added to regimen today   - NGT placed with daughter consent to replace electrolytes and feed    2. Acute hypoxic resp failure due to COVID19  - Improving   - On NC however today at bedside NC out of nares and pt sating 95%  - Completed Plaquenil (last dose today)  - Rise in inflammatory markers on 4/21. Will repeat in am tomorrow 4/25  - High temps yesterday/overnight, Tmax 103. Tylenol PRN, check procal in am     3. ALVARO vs CKD with hypernatremia   - Likely CKD per daughter history, lithium induced  - ALVARO had improved, worsened again yesterday and today, Cr. 187  - Na 161 today  - Hypernatremia likely from poor PO intake?  - C/w IV fluids D5 at this time  - Nehpro consult placed today     4. Bipolar disorder  - Continue home meds; for years was on lithium which controlled symptoms but was stopped due to kidney damage; she has struggled to treat bipolar since then  - Psych consult appreciated, continue Remeron, Trileptal, Ativan and Zyprexa     5. Hypothyroid  - C/w synthroid    6. Dysphagia  - C/w tube feeds (Day 2 NGT)  - S&S following     #Advanced directives; currently full code after admitting physician discussed with daughter HCP Arcelia [phone]   PT eval: home w/ assist on DC, has 24/7 aides at home     #DVT ppx - lovenox daily

## 2020-04-24 NOTE — CONSULT NOTE ADULT - ASSESSMENT
Hypernatremia  ALVARO    Euvolemic Hypernatremia likely R/t poor PO intake  serum Na 141 on admission, now 161    Monitor SCr, Strict Is and Os, Bladder scan  Creatinine Trend:  SCr 1.87 [04-24 @ 06:58]  SCr 1.66 [04-23 @ 08:36]  SCr 1.18 [04-22 @ 11:33]  SCr 1.24 [04-21 @ 08:52]  SCr 1.37 [04-20 @ 16:12]  Continue Hypotonic IV fluids, Free water via NG tube    Bladder scan    D/W bedside RN Hypernatremia  ALVARO    Euvolemic Hypernatremia likely R/t poor PO intake  serum Na 141 on admission, now 161  Continue Hypotonic IV fluids, Free water via NG tube  Monitor SCr, Strict Is and Os, Bladder scan  Creatinine Trend:  SCr 1.87 [04-24 @ 06:58]  SCr 1.66 [04-23 @ 08:36]  SCr 1.18 [04-22 @ 11:33]  SCr 1.24 [04-21 @ 08:52]  SCr 1.37 [04-20 @ 16:12]      D/W bedside RN Hypernatremia (SNa+ 140--> 161)  ALVARO  COVID 19+      Hypernatremia in setting of poor PO intake  Pt on lithium; low specific gravity  Obtain Urine osm  Continue Hypotonic IV fluids, Free water via NG tube  Bladder scan, strict I/O's    Also with ALVARO; likely ATN in setting of COVID 19+  Monitor Scr, lytes and UOP

## 2020-04-24 NOTE — PHYSICAL THERAPY INITIAL EVALUATION ADULT - GAIT PATTERN USED, PT EVAL
unsteadiness throughout, poor command following, decreased myesha step length, O2 sat dropped to 83% p ambulation on 2L of O2, RN aware, O2 nima to 90% p 3 minute sitting rest break on 2L

## 2020-04-24 NOTE — PHYSICAL THERAPY INITIAL EVALUATION ADULT - CRITERIA FOR SKILLED THERAPEUTIC INTERVENTIONS
anticipated discharge recommendation/impairments found/rehab potential/predicted duration of therapy intervention/functional limitations in following categories/therapy frequency

## 2020-04-24 NOTE — CONSULT NOTE ADULT - SUBJECTIVE AND OBJECTIVE BOX
[facility] DIVISION OF KIDNEY DISEASES AND HYPERTENSION -- INITIAL CONSULT NOTE  --------------------------------------------------------------------------------  HPI: Admit 04/20/20  64 yo F w/ hx bipolar disorder, intermittently nonverbal, presents to ER for persistent fevers and new hypoxia (80's on room air) since being diagnosed with COVID week prior as outpatient.  Per aide at bedside, patient noted to have poor PO intake, poor urine output and fevers/hypoxia. Been checking pulse ox intermittently. Denies nausea/vomiting or diarrhea. +BM's.   at baseline, ambulates and able to feed self.  Ros unable to obtain as patient not responding to questions/commands. In ER , hypoxic at 85% RA and improved with 100% o2    As per bedside RN, patient not speaking. Intermittently following commands. Worked with PT today. On Nasal cannula    PAST HISTORY  --------------------------------------------------------------------------------  PAST MEDICAL & SURGICAL HISTORY:  Hypothyroid  Bipolar 1 disorder  No significant past surgical history    FAMILY HISTORY:  No pertinent family history in first degree relatives    PAST SOCIAL HISTORY:  No smoking    ALLERGIES & MEDICATIONS  --------------------------------------------------------------------------------  Allergies  Depakote (Hives)  lithium (Hives)    Standing Inpatient Medications  benztropine 0.5 milliGRAM(s) Oral two times a day  cinacalcet 30 milliGRAM(s) Oral two times a day  cyanocobalamin 1000 MICROGram(s) Oral daily  dextrose 5%. 1000 milliLiter(s) IV Continuous <Continuous>  enoxaparin Injectable 40 milliGRAM(s) SubCutaneous daily  hydroxychloroquine   Oral   hydroxychloroquine 400 milliGRAM(s) Oral every 24 hours  levothyroxine 75 MICROGram(s) Oral daily  LORazepam     Tablet 0.5 milliGRAM(s) Oral two times a day  mirtazapine 15 milliGRAM(s) Oral at bedtime  OLANZapine 5 milliGRAM(s) Oral two times a day  OXcarbazepine 300 milliGRAM(s) Oral two times a day  polyethylene glycol 3350 17 Gram(s) Oral daily  senna 2 Tablet(s) Oral at bedtime    PRN Inpatient Medications  acetaminophen  Suppository .. 650 milliGRAM(s) Rectal every 6 hours PRN  ALBUTerol    90 MICROgram(s) HFA Inhaler 2 Puff(s) Inhalation every 6 hours PRN  bisacodyl Suppository 10 milliGRAM(s) Rectal daily PRN  guaiFENesin   Syrup  (Sugar-Free) 200 milliGRAM(s) Oral every 6 hours PRN    REVIEW OF SYSTEMS  --------------------------------------------------------------------------------  BELA    VITALS/PHYSICAL EXAM  --------------------------------------------------------------------------------  T(C): 37.7 (04-24-20 @ 08:32), Max: 39.6 (04-24-20 @ 02:48)  HR: 99 (04-24-20 @ 08:32) (90 - 99)  BP: 140/83 (04-24-20 @ 08:32) (100/59 - 140/83)  RR: 19 (04-24-20 @ 08:32) (19 - 20)  SpO2: 94% (04-23-20 @ 23:49) (90% - 96%)    04-23-20 @ 07:01  -  04-24-20 @ 07:00  --------------------------------------------------------  IN: 690 mL / OUT: 0 mL / NET: 690 mL    Physical Exam:  Due to the nature of this patient's COVID-19 isolation status, no bedside physical exam was done to limit spread of infection. Objective data were reviewed in detail.    LABS/STUDIES  --------------------------------------------------------------------------------              10.6   5.55  >-----------<  342      [04-24-20 @ 07:03]              35.7     161  |  117  |  31.0  ----------------------------<  101      [04-24-20 @ 06:58]  4.7   |  29.0  |  1.87        Ca     9.6     [04-24-20 @ 06:58]      Mg     2.3     [04-24-20 @ 06:58]    TPro  6.7  /  Alb  3.3  /  TBili  0.2  /  DBili  x   /  AST  19  /  ALT  10  /  AlkPhos  67  [04-24-20 @ 06:58]          [04-24-20 @ 06:58]    Creatinine Trend:  SCr 1.87 [04-24 @ 06:58]  SCr 1.66 [04-23 @ 08:36]  SCr 1.18 [04-22 @ 11:33]  SCr 1.24 [04-21 @ 08:52]  SCr 1.37 [04-20 @ 16:12]    Urinalysis - [04-22-20 @ 00:59]      Color Yellow / Appearance Clear / SG 1.005 / pH 7.0      Gluc Negative / Ketone Negative  / Bili Negative / Urobili Negative       Blood Small / Protein 15 / Leuk Est Negative / Nitrite Negative      RBC 0-2 / WBC Negative / Hyaline  / Gran  / Sq Epi  / Non Sq Epi Occasional / Bacteria     Ferritin 271      [04-21-20 @ 08:52]  TSH 0.21      [04-23-20 @ 14:28]    HCV 0.10, Nonreact      [04-21-20 @ 16:36] [facility] DIVISION OF KIDNEY DISEASES AND HYPERTENSION -- INITIAL CONSULT NOTE  --------------------------------------------------------------------------------  HPI: Admit 04/20/20  ''66 yo F w/ hx bipolar disorder, intermittently nonverbal, presents to ER for persistent fevers and new hypoxia (80's on room air) since being diagnosed with COVID week prior as outpatient.  Per aide at bedside, patient noted to have poor PO intake, poor urine output and fevers/hypoxia. Been checking pulse ox intermittently. Denies nausea/vomiting or diarrhea. +BM's.   at baseline, ambulates and able to feed self.  Ros unable to obtain as patient not responding to questions/commands. In ER , hypoxic at 85% RA and improved with 100% o2''    Nephrology consulted for hypernatremia.  As per bedside RN, patient not speaking. Intermittently following commands. Worked with PT today. On Nasal cannula    PAST HISTORY  --------------------------------------------------------------------------------  PAST MEDICAL & SURGICAL HISTORY:  Hypothyroid  Bipolar 1 disorder  No significant past surgical history    FAMILY HISTORY:  No pertinent family history in first degree relatives    PAST SOCIAL HISTORY:  No smoking    ALLERGIES & MEDICATIONS  --------------------------------------------------------------------------------  Allergies  Depakote (Hives)  lithium (Hives)    Standing Inpatient Medications  benztropine 0.5 milliGRAM(s) Oral two times a day  cinacalcet 30 milliGRAM(s) Oral two times a day  cyanocobalamin 1000 MICROGram(s) Oral daily  dextrose 5%. 1000 milliLiter(s) IV Continuous <Continuous>  enoxaparin Injectable 40 milliGRAM(s) SubCutaneous daily  hydroxychloroquine   Oral   hydroxychloroquine 400 milliGRAM(s) Oral every 24 hours  levothyroxine 75 MICROGram(s) Oral daily  LORazepam     Tablet 0.5 milliGRAM(s) Oral two times a day  mirtazapine 15 milliGRAM(s) Oral at bedtime  OLANZapine 5 milliGRAM(s) Oral two times a day  OXcarbazepine 300 milliGRAM(s) Oral two times a day  polyethylene glycol 3350 17 Gram(s) Oral daily  senna 2 Tablet(s) Oral at bedtime    PRN Inpatient Medications  acetaminophen  Suppository .. 650 milliGRAM(s) Rectal every 6 hours PRN  ALBUTerol    90 MICROgram(s) HFA Inhaler 2 Puff(s) Inhalation every 6 hours PRN  bisacodyl Suppository 10 milliGRAM(s) Rectal daily PRN  guaiFENesin   Syrup  (Sugar-Free) 200 milliGRAM(s) Oral every 6 hours PRN    REVIEW OF SYSTEMS  --------------------------------------------------------------------------------  BELA    VITALS/PHYSICAL EXAM  --------------------------------------------------------------------------------  T(C): 37.7 (04-24-20 @ 08:32), Max: 39.6 (04-24-20 @ 02:48)  HR: 99 (04-24-20 @ 08:32) (90 - 99)  BP: 140/83 (04-24-20 @ 08:32) (100/59 - 140/83)  RR: 19 (04-24-20 @ 08:32) (19 - 20)  SpO2: 94% (04-23-20 @ 23:49) (90% - 96%)    04-23-20 @ 07:01  -  04-24-20 @ 07:00  --------------------------------------------------------  IN: 690 mL / OUT: 0 mL / NET: 690 mL    Physical Exam:  Due to the nature of this patient's COVID-19 isolation status, no bedside physical exam was done to limit spread of infection. Objective data were reviewed in detail.    LABS/STUDIES  --------------------------------------------------------------------------------              10.6   5.55  >-----------<  342      [04-24-20 @ 07:03]              35.7     161  |  117  |  31.0  ----------------------------<  101      [04-24-20 @ 06:58]  4.7   |  29.0  |  1.87        Ca     9.6     [04-24-20 @ 06:58]      Mg     2.3     [04-24-20 @ 06:58]    TPro  6.7  /  Alb  3.3  /  TBili  0.2  /  DBili  x   /  AST  19  /  ALT  10  /  AlkPhos  67  [04-24-20 @ 06:58]          [04-24-20 @ 06:58]    Creatinine Trend:  SCr 1.87 [04-24 @ 06:58]  SCr 1.66 [04-23 @ 08:36]  SCr 1.18 [04-22 @ 11:33]  SCr 1.24 [04-21 @ 08:52]  SCr 1.37 [04-20 @ 16:12]    Urinalysis - [04-22-20 @ 00:59]      Color Yellow / Appearance Clear / SG 1.005 / pH 7.0      Gluc Negative / Ketone Negative  / Bili Negative / Urobili Negative       Blood Small / Protein 15 / Leuk Est Negative / Nitrite Negative      RBC 0-2 / WBC Negative / Hyaline  / Gran  / Sq Epi  / Non Sq Epi Occasional / Bacteria     Ferritin 271      [04-21-20 @ 08:52]  TSH 0.21      [04-23-20 @ 14:28]    HCV 0.10, Nonreact      [04-21-20 @ 16:36]

## 2020-04-24 NOTE — PROGRESS NOTE ADULT - SUBJECTIVE AND OBJECTIVE BOX
CC: hypoxia (20 Apr 2020 18:00)    INTERVAL HPI/OVERNIGHT EVENTS: With elevated temps overnight, T max 103 early this am around 2am    Pt seen and examined at bedside  Pt laying in bed, eyes closed, does not respond to name/light touch, non verbal this am  NGT present, NC not in nares and sating 95%  No noticeable jerking/twictching seen at rest   Unable to obtain ROS    Vital Signs Last 24 Hrs  T(C): 37.7 (24 Apr 2020 08:32), Max: 39.6 (24 Apr 2020 02:48)  T(F): 99.8 (24 Apr 2020 08:32), Max: 103.3 (24 Apr 2020 02:48)  HR: 99 (24 Apr 2020 08:32) (90 - 99)  BP: 140/83 (24 Apr 2020 08:32) (100/59 - 140/83)  BP(mean): --  RR: 19 (24 Apr 2020 08:32) (19 - 20)  SpO2: 94% (23 Apr 2020 23:49) (90% - 96%)    PHYSICAL EXAM:  General: In no acute distress; less responsive appears catatonic   ENMT: No nasal discharge; airway clear; edentulous; NC in place  Gastrointestinal: Soft non-tender non-distended; Normal bowel sounds  Extremities: Normal range of motion, No clubbing, cyanosis or edema  Vascular: Peripheral pulses palpable 2+ bilaterally  Neurological: Laying in bed, sleeping, unable to assess, does not open eyes or respond to verbal stimuli      LABS/IMAGING: REVIEWED CC: hypoxia (20 Apr 2020 18:00)    INTERVAL HPI/OVERNIGHT EVENTS: With elevated temps overnight, T max 103 early this am around 2am    Pt seen and examined at bedside  Pt laying in bed, eyes closed, does not respond to name/light touch, non verbal this am  NGT present, NC not in nares and sating 95%   Unable to obtain ROS    Vital Signs Last 24 Hrs  T(C): 37.7 (24 Apr 2020 08:32), Max: 39.6 (24 Apr 2020 02:48)  T(F): 99.8 (24 Apr 2020 08:32), Max: 103.3 (24 Apr 2020 02:48)  HR: 99 (24 Apr 2020 08:32) (90 - 99)  BP: 140/83 (24 Apr 2020 08:32) (100/59 - 140/83)  BP(mean): --  RR: 19 (24 Apr 2020 08:32) (19 - 20)  SpO2: 94% (23 Apr 2020 23:49) (90% - 96%)    PHYSICAL EXAM:  General: In no acute distress; less responsive appears catatonic   ENMT: No nasal discharge; airway clear; edentulous; NC in place  Gastrointestinal: Soft non-tender non-distended; Normal bowel sounds  Extremities: Normal range of motion, No clubbing, cyanosis or edema, small tremor upper extremities at rest noted  Vascular: Peripheral pulses palpable 2+ bilaterally  Neurological: Laying in bed, sleeping, unable to assess, does not open eyes or respond to verbal stimuli      LABS/IMAGING: REVIEWED

## 2020-04-25 LAB
ALBUMIN SERPL ELPH-MCNC: 3.1 G/DL — LOW (ref 3.3–5.2)
ANION GAP SERPL CALC-SCNC: 15 MMOL/L — SIGNIFICANT CHANGE UP (ref 5–17)
ANION GAP SERPL CALC-SCNC: 9 MMOL/L — SIGNIFICANT CHANGE UP (ref 5–17)
BUN SERPL-MCNC: 28 MG/DL — HIGH (ref 8–20)
BUN SERPL-MCNC: 32 MG/DL — HIGH (ref 8–20)
CA-I BLD-SCNC: 1.33 MMOL/L — HIGH (ref 1.12–1.3)
CALCIUM SERPL-MCNC: 10 MG/DL — SIGNIFICANT CHANGE UP (ref 8.6–10.2)
CALCIUM SERPL-MCNC: 10.4 MG/DL — HIGH (ref 8.6–10.2)
CALCIUM SERPL-MCNC: 9.7 MG/DL — SIGNIFICANT CHANGE UP (ref 8.4–10.5)
CHLORIDE SERPL-SCNC: 111 MMOL/L — HIGH (ref 98–107)
CHLORIDE SERPL-SCNC: 113 MMOL/L — HIGH (ref 98–107)
CO2 SERPL-SCNC: 29 MMOL/L — SIGNIFICANT CHANGE UP (ref 22–29)
CO2 SERPL-SCNC: 33 MMOL/L — HIGH (ref 22–29)
CREAT SERPL-MCNC: 1.1 MG/DL — SIGNIFICANT CHANGE UP (ref 0.5–1.3)
CREAT SERPL-MCNC: 1.33 MG/DL — HIGH (ref 0.5–1.3)
CRP SERPL-MCNC: 11.02 MG/DL — HIGH (ref 0–0.4)
FERRITIN SERPL-MCNC: 362 NG/ML — HIGH (ref 15–150)
GLUCOSE SERPL-MCNC: 116 MG/DL — HIGH (ref 70–99)
GLUCOSE SERPL-MCNC: 123 MG/DL — HIGH (ref 70–99)
HCT VFR BLD CALC: 35.2 % — SIGNIFICANT CHANGE UP (ref 34.5–45)
HGB BLD-MCNC: 10.2 G/DL — LOW (ref 11.5–15.5)
LDH SERPL L TO P-CCNC: 268 U/L — HIGH (ref 98–192)
MCHC RBC-ENTMCNC: 29 GM/DL — LOW (ref 32–36)
MCHC RBC-ENTMCNC: 30.4 PG — SIGNIFICANT CHANGE UP (ref 27–34)
MCV RBC AUTO: 104.8 FL — HIGH (ref 80–100)
PHOSPHATE SERPL-MCNC: 2.7 MG/DL — SIGNIFICANT CHANGE UP (ref 2.4–4.7)
PLATELET # BLD AUTO: 295 K/UL — SIGNIFICANT CHANGE UP (ref 150–400)
POTASSIUM SERPL-MCNC: 4.6 MMOL/L — SIGNIFICANT CHANGE UP (ref 3.5–5.3)
POTASSIUM SERPL-MCNC: 5.3 MMOL/L — SIGNIFICANT CHANGE UP (ref 3.5–5.3)
POTASSIUM SERPL-SCNC: 4.6 MMOL/L — SIGNIFICANT CHANGE UP (ref 3.5–5.3)
POTASSIUM SERPL-SCNC: 5.3 MMOL/L — SIGNIFICANT CHANGE UP (ref 3.5–5.3)
PROCALCITONIN SERPL-MCNC: 0.18 NG/ML — HIGH (ref 0.02–0.1)
PTH-INTACT FLD-MCNC: 73 PG/ML — HIGH (ref 15–65)
RBC # BLD: 3.36 M/UL — LOW (ref 3.8–5.2)
RBC # FLD: 14.2 % — SIGNIFICANT CHANGE UP (ref 10.3–14.5)
SODIUM SERPL-SCNC: 153 MMOL/L — HIGH (ref 135–145)
SODIUM SERPL-SCNC: 157 MMOL/L — HIGH (ref 135–145)
WBC # BLD: 5.36 K/UL — SIGNIFICANT CHANGE UP (ref 3.8–10.5)
WBC # FLD AUTO: 5.36 K/UL — SIGNIFICANT CHANGE UP (ref 3.8–10.5)

## 2020-04-25 PROCEDURE — 99233 SBSQ HOSP IP/OBS HIGH 50: CPT | Mod: CS

## 2020-04-25 RX ORDER — DESMOPRESSIN ACETATE 0.1 MG/1
2 TABLET ORAL EVERY 12 HOURS
Refills: 0 | Status: COMPLETED | OUTPATIENT
Start: 2020-04-25 | End: 2020-04-27

## 2020-04-25 RX ORDER — SODIUM CHLORIDE 9 MG/ML
1000 INJECTION, SOLUTION INTRAVENOUS
Refills: 0 | Status: DISCONTINUED | OUTPATIENT
Start: 2020-04-25 | End: 2020-04-28

## 2020-04-25 RX ADMIN — Medication 0.5 MILLIGRAM(S): at 05:12

## 2020-04-25 RX ADMIN — OXCARBAZEPINE 300 MILLIGRAM(S): 300 TABLET, FILM COATED ORAL at 05:10

## 2020-04-25 RX ADMIN — CINACALCET 30 MILLIGRAM(S): 30 TABLET, FILM COATED ORAL at 05:10

## 2020-04-25 RX ADMIN — ENOXAPARIN SODIUM 40 MILLIGRAM(S): 100 INJECTION SUBCUTANEOUS at 10:12

## 2020-04-25 RX ADMIN — Medication 75 MICROGRAM(S): at 05:09

## 2020-04-25 RX ADMIN — Medication 0.5 MILLIGRAM(S): at 16:07

## 2020-04-25 RX ADMIN — OLANZAPINE 5 MILLIGRAM(S): 15 TABLET, FILM COATED ORAL at 05:10

## 2020-04-25 RX ADMIN — SODIUM CHLORIDE 125 MILLILITER(S): 9 INJECTION, SOLUTION INTRAVENOUS at 10:32

## 2020-04-25 RX ADMIN — SODIUM CHLORIDE 100 MILLILITER(S): 9 INJECTION, SOLUTION INTRAVENOUS at 06:05

## 2020-04-25 RX ADMIN — SENNA PLUS 2 TABLET(S): 8.6 TABLET ORAL at 21:47

## 2020-04-25 RX ADMIN — MIRTAZAPINE 15 MILLIGRAM(S): 45 TABLET, ORALLY DISINTEGRATING ORAL at 21:46

## 2020-04-25 RX ADMIN — OXCARBAZEPINE 300 MILLIGRAM(S): 300 TABLET, FILM COATED ORAL at 16:07

## 2020-04-25 RX ADMIN — DESMOPRESSIN ACETATE 2 MICROGRAM(S): 0.1 TABLET ORAL at 17:01

## 2020-04-25 RX ADMIN — OLANZAPINE 5 MILLIGRAM(S): 15 TABLET, FILM COATED ORAL at 16:07

## 2020-04-25 RX ADMIN — PREGABALIN 1000 MICROGRAM(S): 225 CAPSULE ORAL at 10:12

## 2020-04-25 RX ADMIN — POLYETHYLENE GLYCOL 3350 17 GRAM(S): 17 POWDER, FOR SOLUTION ORAL at 10:12

## 2020-04-25 RX ADMIN — Medication 0.5 MILLIGRAM(S): at 05:09

## 2020-04-25 RX ADMIN — SODIUM CHLORIDE 125 MILLILITER(S): 9 INJECTION, SOLUTION INTRAVENOUS at 22:27

## 2020-04-25 NOTE — PROGRESS NOTE ADULT - SUBJECTIVE AND OBJECTIVE BOX
[facility] DIVISION OF KIDNEY DISEASES AND HYPERTENSION -- FOLLOW UP NOTE  --------------------------------------------------------------------------------  Chief Complaint:  ALVARO/Hypernatremia    24 hour events/subjective:  On IVF  Na+ 157 today - downtrend from 161 yesterday  SCr. improved - 1.33 today      PAST HISTORY  --------------------------------------------------------------------------------  No significant changes to PMH, PSH, FHx, SHx, unless otherwise noted    ALLERGIES & MEDICATIONS  --------------------------------------------------------------------------------  Allergies    Depakote (Hives)  lithium (Hives)    Intolerances      Standing Inpatient Medications  benztropine 0.5 milliGRAM(s) Oral two times a day  cyanocobalamin 1000 MICROGram(s) Oral daily  desmopressin Injectable 2 MICROGram(s) IV Push every 12 hours  dextrose 5%. 1000 milliLiter(s) IV Continuous <Continuous>  enoxaparin Injectable 40 milliGRAM(s) SubCutaneous daily  levothyroxine 75 MICROGram(s) Oral daily  LORazepam     Tablet 0.5 milliGRAM(s) Oral two times a day  mirtazapine 15 milliGRAM(s) Oral at bedtime  OLANZapine 5 milliGRAM(s) Oral two times a day  OXcarbazepine 300 milliGRAM(s) Oral two times a day  polyethylene glycol 3350 17 Gram(s) Oral daily  senna 2 Tablet(s) Oral at bedtime    PRN Inpatient Medications  acetaminophen  Suppository .. 650 milliGRAM(s) Rectal every 6 hours PRN  ALBUTerol    90 MICROgram(s) HFA Inhaler 2 Puff(s) Inhalation every 6 hours PRN  bisacodyl Suppository 10 milliGRAM(s) Rectal daily PRN  guaiFENesin   Syrup  (Sugar-Free) 200 milliGRAM(s) Oral every 6 hours PRN      REVIEW OF SYSTEMS  --------------------------------------------------------------------------------  Unable to obtain.    VITALS/PHYSICAL EXAM  --------------------------------------------------------------------------------  T(C): 37.3 (04-25-20 @ 09:03), Max: 37.3 (04-25-20 @ 09:03)  HR: 85 (04-25-20 @ 09:03) (85 - 90)  BP: 142/68 (04-25-20 @ 09:03) (142/68 - 149/66)  RR: 20 (04-25-20 @ 09:03) (20 - 20)  SpO2: 90% (04-24-20 @ 16:42) (90% - 93%)  Wt(kg): --        04-24-20 @ 07:01  -  04-25-20 @ 07:00  --------------------------------------------------------  IN: 3150 mL / OUT: 1000 mL / NET: 2150 mL      Physical Exam:  	Due to the nature of this patient's COVID-19 isolation status, no bedside physical exam was done to limit spread of infection. Objective data were reviewed in detail.    LABS/STUDIES  --------------------------------------------------------------------------------              10.2   5.36  >-----------<  295      [04-25-20 @ 07:40]              35.2     157  |  113  |  32.0  ----------------------------<  116      [04-25-20 @ 07:40]  5.3   |  29.0  |  1.33        Ca     10.4     [04-25-20 @ 07:40]      Mg     2.3     [04-24-20 @ 06:58]    TPro  6.7  /  Alb  3.3  /  TBili  0.2  /  DBili  x   /  AST  19  /  ALT  10  /  AlkPhos  67  [04-24-20 @ 06:58]              [04-24-20 @ 06:58]        [04-25-20 @ 07:40]    Creatinine Trend:  SCr 1.33 [04-25 @ 07:40]  SCr 1.87 [04-24 @ 06:58]  SCr 1.66 [04-23 @ 08:36]  SCr 1.18 [04-22 @ 11:33]  SCr 1.24 [04-21 @ 08:52]    Urinalysis - [04-22-20 @ 00:59]      Color Yellow / Appearance Clear / SG 1.005 / pH 7.0      Gluc Negative / Ketone Negative  / Bili Negative / Urobili Negative       Blood Small / Protein 15 / Leuk Est Negative / Nitrite Negative      RBC 0-2 / WBC Negative / Hyaline  / Gran  / Sq Epi  / Non Sq Epi Occasional / Bacteria     Urine Sodium <30      [04-24-20 @ 13:41]  Urine Osmolality 262      [04-24-20 @ 13:41]    Ferritin 362      [04-25-20 @ 07:40]  TSH 0.21      [04-23-20 @ 14:28]    HCV 0.10, Nonreact      [04-21-20 @ 16:36]

## 2020-04-25 NOTE — CHART NOTE - NSCHARTNOTEFT_GEN_A_CORE
Source: Patient [ ]  Family [ ]   other [ x]    Current Diet: Diet, NPO with Tube Feed:   Tube Feeding Modality: Nasogastric  Jevity 1.5 Catarino  Total Volume for 24 Hours (mL): 1200  Continuous  Starting Tube Feed Rate {mL per Hour}: 10  Increase Tube Feed Rate by (mL): 10  Until Goal Tube Feed Rate (mL per Hour): 50  Tube Feed Duration (in Hours): 24  Tube Feed Start Time: 12:15 (04-23-20 @ 12:11)    Enteral /Parenteral Nutrition: Tube feeds as ordered (goal rate of 50 ml/hr x 20 hrs) provides 1000 ml, 1500 kcal, 64g protein, 760 ml free water, and 100% of RDIs for vitamins/minerals. Receiving an additional 300 ml free water q4 hrs.     Current Weight:   (4/20) 125 lbs  No new weight  No edema documented     Pertinent Medications: MEDICATIONS  (STANDING):  benztropine 0.5 milliGRAM(s) Oral two times a day  cyanocobalamin 1000 MICROGram(s) Oral daily  desmopressin Injectable 2 MICROGram(s) IV Push every 12 hours  dextrose 5%. 1000 milliLiter(s) (125 mL/Hr) IV Continuous <Continuous>  enoxaparin Injectable 40 milliGRAM(s) SubCutaneous daily  levothyroxine 75 MICROGram(s) Oral daily  LORazepam     Tablet 0.5 milliGRAM(s) Oral two times a day  mirtazapine 15 milliGRAM(s) Oral at bedtime  OLANZapine 5 milliGRAM(s) Oral two times a day  OXcarbazepine 300 milliGRAM(s) Oral two times a day  polyethylene glycol 3350 17 Gram(s) Oral daily  senna 2 Tablet(s) Oral at bedtime    MEDICATIONS  (PRN):  acetaminophen  Suppository .. 650 milliGRAM(s) Rectal every 6 hours PRN Temp greater or equal to 38C (100.4F), Mild Pain (1 - 3), Moderate Pain (4 - 6)  ALBUTerol    90 MICROgram(s) HFA Inhaler 2 Puff(s) Inhalation every 6 hours PRN Shortness of Breath and/or Wheezing  bisacodyl Suppository 10 milliGRAM(s) Rectal daily PRN Constipation  guaiFENesin   Syrup  (Sugar-Free) 200 milliGRAM(s) Oral every 6 hours PRN Cough    Pertinent Labs: CBC Full  -  ( 25 Apr 2020 07:40 )  WBC Count : 5.36 K/uL  RBC Count : 3.36 M/uL  Hemoglobin : 10.2 g/dL  Hematocrit : 35.2 %  Platelet Count - Automated : 295 K/uL  Mean Cell Volume : 104.8 fl  Mean Cell Hemoglobin : 30.4 pg  Mean Cell Hemoglobin Concentration : 29.0 gm/dL  Auto Neutrophil # : x  Auto Lymphocyte # : x  Auto Monocyte # : x  Auto Eosinophil # : x  Auto Basophil # : x  Auto Neutrophil % : x  Auto Lymphocyte % : x  Auto Monocyte % : x  Auto Eosinophil % : x  Auto Basophil % : x    04-25 Na157 mmol/L<H> Glu 116 mg/dL<H> K+ 5.3 mmol/L Cr  1.33 mg/dL<H> BUN 32.0 mg/dL<H> Phos n/a   Alb n/a   PAB n/a       Skin: Intact per documentation     Nutrition focused physical exam not conducted at this time- found signs of malnutrition [ ]absent [ ]present    Subcutaneous fat loss: [ ] Orbital fat pads region, [ ]Buccal fat region, [ ]Triceps region,  [ ]Ribs region    Muscle wasting: [ ]Temples region, [ ]Clavicle region, [ ]Shoulder region, [ ]Scapula region, [ ]Interosseous region,  [ ]thigh region, [ ]Calf region    Estimated Needs:   [x ] no change since previous assessment  [ ] recalculated:     Current Nutrition Diagnosis: Pt remains at nutrition risk secondary to increased nutrient needs related to increased physiological demand to maintain nutrition status as evidenced by acute respiratory failure secondary to COVID and acute metabolic/toxic encephalopathy with poor po intake, now with dysphagia requiring need for enteral nutrition. Pt non-verbal. Consult received for tube feeds- NGT in place and Jevity 1.5 ordered for goal rate of 50 ml/hr.     Recommendations:   1) Consider increase tube feeds to 60 ml/hr (x20 hrs) to provide 1200 ml, 1800 kcal, 77g protein, 912 ml free water, and >100% of RDIs for vitamins/minerals. Additional free water per MD discretion.   2) Continue vit B12 supplementation, add MVI, thiamine, and vit C.  3) Monitor tube feed tolerance.  4) Obtain daily weights as feasible to monitor trends.    Monitoring and Evaluation:   [ ] PO intake [ x] Tolerance to diet prescription [X] Weights  [X] Follow up per protocol [X] Labs Source: Patient [ ]  Family [ ]   other [ x]    Current Diet: Diet, NPO with Tube Feed:   Tube Feeding Modality: Nasogastric  Jevity 1.5 Catarino  Total Volume for 24 Hours (mL): 1200  Continuous  Starting Tube Feed Rate {mL per Hour}: 10  Increase Tube Feed Rate by (mL): 10  Until Goal Tube Feed Rate (mL per Hour): 50  Tube Feed Duration (in Hours): 24  Tube Feed Start Time: 12:15 (04-23-20 @ 12:11)    Enteral /Parenteral Nutrition: Tube feeds as ordered (goal rate of 50 ml/hr x 20 hrs) provides 1000 ml, 1500 kcal, 64g protein, 760 ml free water, and 100% of RDIs for vitamins/minerals. Receiving an additional 300 ml free water q4 hrs.     Current Weight:   (4/20) 125 lbs  No new weight  No edema documented     Pertinent Medications: MEDICATIONS  (STANDING):  benztropine 0.5 milliGRAM(s) Oral two times a day  cyanocobalamin 1000 MICROGram(s) Oral daily  desmopressin Injectable 2 MICROGram(s) IV Push every 12 hours  dextrose 5%. 1000 milliLiter(s) (125 mL/Hr) IV Continuous <Continuous>  enoxaparin Injectable 40 milliGRAM(s) SubCutaneous daily  levothyroxine 75 MICROGram(s) Oral daily  LORazepam     Tablet 0.5 milliGRAM(s) Oral two times a day  mirtazapine 15 milliGRAM(s) Oral at bedtime  OLANZapine 5 milliGRAM(s) Oral two times a day  OXcarbazepine 300 milliGRAM(s) Oral two times a day  polyethylene glycol 3350 17 Gram(s) Oral daily  senna 2 Tablet(s) Oral at bedtime    MEDICATIONS  (PRN):  acetaminophen  Suppository .. 650 milliGRAM(s) Rectal every 6 hours PRN Temp greater or equal to 38C (100.4F), Mild Pain (1 - 3), Moderate Pain (4 - 6)  ALBUTerol    90 MICROgram(s) HFA Inhaler 2 Puff(s) Inhalation every 6 hours PRN Shortness of Breath and/or Wheezing  bisacodyl Suppository 10 milliGRAM(s) Rectal daily PRN Constipation  guaiFENesin   Syrup  (Sugar-Free) 200 milliGRAM(s) Oral every 6 hours PRN Cough    Pertinent Labs: CBC Full  -  ( 25 Apr 2020 07:40 )  WBC Count : 5.36 K/uL  RBC Count : 3.36 M/uL  Hemoglobin : 10.2 g/dL  Hematocrit : 35.2 %  Platelet Count - Automated : 295 K/uL  Mean Cell Volume : 104.8 fl  Mean Cell Hemoglobin : 30.4 pg  Mean Cell Hemoglobin Concentration : 29.0 gm/dL  Auto Neutrophil # : x  Auto Lymphocyte # : x  Auto Monocyte # : x  Auto Eosinophil # : x  Auto Basophil # : x  Auto Neutrophil % : x  Auto Lymphocyte % : x  Auto Monocyte % : x  Auto Eosinophil % : x  Auto Basophil % : x    04-25 Na157 mmol/L<H> Glu 116 mg/dL<H> K+ 5.3 mmol/L Cr  1.33 mg/dL<H> BUN 32.0 mg/dL<H> Phos n/a   Alb n/a   PAB n/a       Skin: Intact per documentation     Nutrition focused physical exam not conducted at this time- found signs of malnutrition [ ]absent [ ]present    Subcutaneous fat loss: [ ] Orbital fat pads region, [ ]Buccal fat region, [ ]Triceps region,  [ ]Ribs region    Muscle wasting: [ ]Temples region, [ ]Clavicle region, [ ]Shoulder region, [ ]Scapula region, [ ]Interosseous region,  [ ]thigh region, [ ]Calf region    Estimated Needs:   [x ] no change since previous assessment  [ ] recalculated:     Current Nutrition Diagnosis: Pt remains at nutrition risk secondary to increased nutrient needs related to increased physiological demand to maintain nutrition status as evidenced by acute respiratory failure secondary to COVID and acute metabolic/toxic encephalopathy with poor po intake, now with dysphagia requiring need for enteral nutrition. Pt non-verbal. Consult received for tube feeds- NGT in place and Jevity 1.5 ordered for goal rate of 50 ml/hr.     Recommendations:   1) Consider increase tube feeds to goal rate of 60 ml/hr (x20 hrs) to provide 1200 ml, 1800 kcal, 77g protein, 912 ml free water, and >100% of RDIs for vitamins/minerals. Additional free water per MD discretion.   2) Continue vit B12 supplementation, add MVI, thiamine, and vit C.  3) Monitor tube feed tolerance.  4) Obtain daily weights as feasible to monitor trends.    Monitoring and Evaluation:   [ ] PO intake [ x] Tolerance to diet prescription [X] Weights  [X] Follow up per protocol [X] Labs

## 2020-04-25 NOTE — PROGRESS NOTE ADULT - ASSESSMENT
1. Hypernatremia (SNa+ 140--> 161)  2. ALVARO: likely ATN in setting of COVID 19+  3. COVID 19+     Hypernatremia in setting of poor PO intake  Pt on lithium; low specific gravity  Obtain Urine osm  Continue D5W @125ml/hr, Free water via NG tube  Bladder scan, strict I/O's  Monitor Scr, lytes and UOP 1. Hypernatremia (SNa+ 140--> 161)  2. ALVARO: likely ATN in setting of COVID 19+  3. COVID 19+     Hypernatremia in setting of poor PO intake  Pt on lithium; low specific gravity  Obtain Urine osm  Continue D5W @125ml/hr, Free water via NG tube  Bladder scan, strict I/O's  Monitor Scr, lytes and UOP    I was Physically Present for the key portions of the Evaluation & management ( E/M ) Service provided.    I agree with the above History  , Physical examination & Treatment Plans,    I have Reviewed , Modified or appended where appropriate,

## 2020-04-25 NOTE — PROGRESS NOTE ADULT - ASSESSMENT
66 yo F w/ hx bipolar disorder, intermittently nonverbal presents to ER for persistent fevers and new hypoxia (80's on room air) since being diagnosed with COVID week prior as outpatient. In the ED patient as noted to be hypoxic at 85% RA and improved with 100% o2 with 4 L NC.     1. Acute metabolic/toxic encephalopathy   - Multifactorial, hypernatremia, metabolic encephalopathy, possible catatonia  - CT head neg   - EEG with epileptiform activity but no clinical seizure. Continue trileptal and keppra 500mg BID  - Neuro following, recs appreciated   - Psych consult appreciated, c/w remeron, ativan, zyprexa, trileptal  - Cogentin added to regimen today   - NGT in place for feeds  - will increase ativan to TID today    2. Acute hypoxic resp failure due to COVID19  - Improving   - Completed Plaquenil (last dose today)  - Rise in inflammatory markers on 4/21. some improvement today    3. 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 Bipolar disorder  - Continue home meds; for years was on lithium which controlled symptoms but was stopped due to kidney damage; she has struggled to treat bipolar since then  - Psych consult appreciated, continue Remeron, Trileptal, Ativan and Zyprexa     5. Hypothyroid  - C/w synthroid    6. Dysphagia  - C/w tube feeds (Day 2 NGT)  - S&S following     7. Malnourished  - plan as above    #DVT ppx - lovenox daily    DISPO: hope to discharge home with O2 to resume 24 hour home aids. Patient still with desaturation with movement even on 2 LPM (to 84%) will need to continue efforts to improve mental status and eventual get more mobile before discharge home.

## 2020-04-25 NOTE — PROGRESS NOTE ADULT - SUBJECTIVE AND OBJECTIVE BOX
CC: hypoxia (24 Apr 2020 12:03)    INTERVAL HPI/OVERNIGHT EVENTS:  patient overnight without acute events  on room air at rest she is in the mid 90's oxygen saturation  with ambulation or movement patient saturation decreases  was able to sit on edge of bed with PT  patient still with NGT    Vital Signs Last 24 Hrs  T(C): 37.3 (25 Apr 2020 09:03), Max: 37.3 (25 Apr 2020 09:03)  T(F): 99.1 (25 Apr 2020 09:03), Max: 99.1 (25 Apr 2020 09:03)  HR: 85 (25 Apr 2020 09:03) (85 - 90)  BP: 142/68 (25 Apr 2020 09:03) (142/68 - 149/66)  BP(mean): --  RR: 20 (25 Apr 2020 09:03) (20 - 20)  SpO2: 90% (24 Apr 2020 16:42) (90% - 93%)    General: in no acute distress; moving freely, less rigid, patient able to say hello and answer questions at times but still catatonic at times  ENMT: No nasal discharge; airway clear; edentulous; NGT in place  Gastrointestinal: Soft non-tender non-distended; Normal bowel sounds  Extremities: Normal range of motion, No clubbing, cyanosis or edema  Vascular: Peripheral pulses palpable 2+ bilaterally  Neurological: Alert at times during exam and resists noxious stimuli.    I&O's Detail    24 Apr 2020 07:01  -  25 Apr 2020 07:00  --------------------------------------------------------  IN:    dextrose 5%.: 1200 mL    dextrose 5%.: 100 mL    ns in tub fed  [id number]: 650 mL    Oral Fluid: 1200 mL  Total IN: 3150 mL    OUT:    Voided: 1000 mL  Total OUT: 1000 mL    Total NET: 2150 mL    CARDIAC MARKERS ( 24 Apr 2020 06:58 )  x     / x     / 166 U/L / x     / 1.3 ng/mL               10.2   5.36  )-----------( 295      ( 25 Apr 2020 07:40 )             35.2     25 Apr 2020 07:40    157    |  113    |  32.0   ----------------------------<  116    5.3     |  29.0   |  1.33     Ca    10.4       25 Apr 2020 07:40  Mg     2.3       24 Apr 2020 06:58    TPro  6.7    /  Alb  3.3    /  TBili  0.2    /  DBili  x      /  AST  19     /  ALT  10     /  AlkPhos  67     24 Apr 2020 06:58    CAPILLARY BLOOD GLUCOSE    LIVER FUNCTIONS - ( 24 Apr 2020 06:58 )  Alb: 3.3 g/dL / Pro: 6.7 g/dL / ALK PHOS: 67 U/L / ALT: 10 U/L / AST: 19 U/L / GGT: x           MEDICATIONS  (STANDING):  benztropine 0.5 milliGRAM(s) Oral two times a day  cyanocobalamin 1000 MICROGram(s) Oral daily  desmopressin Injectable 2 MICROGram(s) IV Push every 12 hours  dextrose 5%. 1000 milliLiter(s) (125 mL/Hr) IV Continuous <Continuous>  enoxaparin Injectable 40 milliGRAM(s) SubCutaneous daily  levothyroxine 75 MICROGram(s) Oral daily  LORazepam     Tablet 0.5 milliGRAM(s) Oral two times a day  mirtazapine 15 milliGRAM(s) Oral at bedtime  OLANZapine 5 milliGRAM(s) Oral two times a day  OXcarbazepine 300 milliGRAM(s) Oral two times a day  polyethylene glycol 3350 17 Gram(s) Oral daily  senna 2 Tablet(s) Oral at bedtime    MEDICATIONS  (PRN):  acetaminophen  Suppository .. 650 milliGRAM(s) Rectal every 6 hours PRN Temp greater or equal to 38C (100.4F), Mild Pain (1 - 3), Moderate Pain (4 - 6)  ALBUTerol    90 MICROgram(s) HFA Inhaler 2 Puff(s) Inhalation every 6 hours PRN Shortness of Breath and/or Wheezing  bisacodyl Suppository 10 milliGRAM(s) Rectal daily PRN Constipation  guaiFENesin   Syrup  (Sugar-Free) 200 milliGRAM(s) Oral every 6 hours PRN Cough      RADIOLOGY & ADDITIONAL TESTS:

## 2020-04-26 LAB
ANION GAP SERPL CALC-SCNC: 12 MMOL/L — SIGNIFICANT CHANGE UP (ref 5–17)
BUN SERPL-MCNC: 25 MG/DL — HIGH (ref 8–20)
CALCIUM SERPL-MCNC: 10.4 MG/DL — HIGH (ref 8.6–10.2)
CHLORIDE SERPL-SCNC: 109 MMOL/L — HIGH (ref 98–107)
CO2 SERPL-SCNC: 32 MMOL/L — HIGH (ref 22–29)
CREAT SERPL-MCNC: 1.01 MG/DL — SIGNIFICANT CHANGE UP (ref 0.5–1.3)
GLUCOSE SERPL-MCNC: 90 MG/DL — SIGNIFICANT CHANGE UP (ref 70–99)
POTASSIUM SERPL-MCNC: 4.9 MMOL/L — SIGNIFICANT CHANGE UP (ref 3.5–5.3)
POTASSIUM SERPL-SCNC: 4.9 MMOL/L — SIGNIFICANT CHANGE UP (ref 3.5–5.3)
SODIUM SERPL-SCNC: 153 MMOL/L — HIGH (ref 135–145)

## 2020-04-26 PROCEDURE — 99233 SBSQ HOSP IP/OBS HIGH 50: CPT | Mod: CS

## 2020-04-26 RX ORDER — FOLIC ACID 0.8 MG
1 TABLET ORAL DAILY
Refills: 0 | Status: DISCONTINUED | OUTPATIENT
Start: 2020-04-26 | End: 2020-05-05

## 2020-04-26 RX ADMIN — Medication 1 MILLIGRAM(S): at 20:27

## 2020-04-26 RX ADMIN — PREGABALIN 1000 MICROGRAM(S): 225 CAPSULE ORAL at 15:18

## 2020-04-26 RX ADMIN — DESMOPRESSIN ACETATE 2 MICROGRAM(S): 0.1 TABLET ORAL at 18:48

## 2020-04-26 RX ADMIN — OLANZAPINE 5 MILLIGRAM(S): 15 TABLET, FILM COATED ORAL at 18:48

## 2020-04-26 RX ADMIN — Medication 75 MICROGRAM(S): at 05:20

## 2020-04-26 RX ADMIN — OXCARBAZEPINE 300 MILLIGRAM(S): 300 TABLET, FILM COATED ORAL at 18:48

## 2020-04-26 RX ADMIN — OXCARBAZEPINE 300 MILLIGRAM(S): 300 TABLET, FILM COATED ORAL at 05:20

## 2020-04-26 RX ADMIN — SENNA PLUS 2 TABLET(S): 8.6 TABLET ORAL at 20:27

## 2020-04-26 RX ADMIN — Medication 1 MILLIGRAM(S): at 13:08

## 2020-04-26 RX ADMIN — POLYETHYLENE GLYCOL 3350 17 GRAM(S): 17 POWDER, FOR SOLUTION ORAL at 13:08

## 2020-04-26 RX ADMIN — Medication 0.5 MILLIGRAM(S): at 18:48

## 2020-04-26 RX ADMIN — ENOXAPARIN SODIUM 40 MILLIGRAM(S): 100 INJECTION SUBCUTANEOUS at 13:09

## 2020-04-26 RX ADMIN — DESMOPRESSIN ACETATE 2 MICROGRAM(S): 0.1 TABLET ORAL at 05:21

## 2020-04-26 RX ADMIN — Medication 0.5 MILLIGRAM(S): at 05:20

## 2020-04-26 RX ADMIN — MIRTAZAPINE 15 MILLIGRAM(S): 45 TABLET, ORALLY DISINTEGRATING ORAL at 20:27

## 2020-04-26 RX ADMIN — OLANZAPINE 5 MILLIGRAM(S): 15 TABLET, FILM COATED ORAL at 05:20

## 2020-04-26 RX ADMIN — SODIUM CHLORIDE 125 MILLILITER(S): 9 INJECTION, SOLUTION INTRAVENOUS at 15:18

## 2020-04-26 NOTE — PROGRESS NOTE ADULT - ASSESSMENT
1. Hypernatremia (SNa+ 140--> 161)  2. ALVARO: likely ATN in setting of COVID 19+  3. COVID 19+     Hypernatremia in setting of poor PO intake - continues to improve  Pt on lithium; low specific gravity  Obtain Urine osm  Continue D5W @125ml/hr, Free water via NG tube  Bladder scan, strict I/O's  Monitor Scr, lytes and UOP

## 2020-04-26 NOTE — PROGRESS NOTE ADULT - SUBJECTIVE AND OBJECTIVE BOX
[facility] DIVISION OF KIDNEY DISEASES AND HYPERTENSION -- FOLLOW UP NOTE  --------------------------------------------------------------------------------  Chief Complaint:  ALVARO, Hypernatremia    24 hour events/subjective:  Serum Na continues to downtrend  Na 153 today (157 yesterday)  SCr. continued to improve - now normal at 1.10      PAST HISTORY  --------------------------------------------------------------------------------  No significant changes to PMH, PSH, FHx, SHx, unless otherwise noted    ALLERGIES & MEDICATIONS  --------------------------------------------------------------------------------  Allergies    Depakote (Hives)  lithium (Hives)    Intolerances      Standing Inpatient Medications  benztropine 0.5 milliGRAM(s) Oral two times a day  cyanocobalamin 1000 MICROGram(s) Oral daily  desmopressin Injectable 2 MICROGram(s) IV Push every 12 hours  dextrose 5%. 1000 milliLiter(s) IV Continuous <Continuous>  enoxaparin Injectable 40 milliGRAM(s) SubCutaneous daily  folic acid 1 milliGRAM(s) Oral daily  levothyroxine 75 MICROGram(s) Oral daily  LORazepam     Tablet 1 milliGRAM(s) Oral three times a day  mirtazapine 15 milliGRAM(s) Oral at bedtime  OLANZapine 5 milliGRAM(s) Oral two times a day  OXcarbazepine 300 milliGRAM(s) Oral two times a day  polyethylene glycol 3350 17 Gram(s) Oral daily  senna 2 Tablet(s) Oral at bedtime    PRN Inpatient Medications  acetaminophen  Suppository .. 650 milliGRAM(s) Rectal every 6 hours PRN  ALBUTerol    90 MICROgram(s) HFA Inhaler 2 Puff(s) Inhalation every 6 hours PRN  bisacodyl Suppository 10 milliGRAM(s) Rectal daily PRN  guaiFENesin   Syrup  (Sugar-Free) 200 milliGRAM(s) Oral every 6 hours PRN      REVIEW OF SYSTEMS  --------------------------------------------------------------------------------  Unable to obtain    VITALS/PHYSICAL EXAM  --------------------------------------------------------------------------------  T(C): 36.9 (04-26-20 @ 09:40), Max: 37.3 (04-26-20 @ 00:20)  HR: 98 (04-26-20 @ 09:40) (78 - 100)  BP: 127/80 (04-26-20 @ 09:40) (126/76 - 156/69)  RR: 19 (04-26-20 @ 09:40) (19 - 19)  SpO2: 90% (04-26-20 @ 09:40) (90% - 96%)  Wt(kg): --        04-25-20 @ 07:01  -  04-26-20 @ 07:00  --------------------------------------------------------  IN: 4050 mL / OUT: 1825 mL / NET: 2225 mL      Physical Exam:  	Due to the nature of this patient's COVID-19 isolation status, no bedside physical exam was done to limit spread of infection. Objective data were reviewed in detail.    LABS/STUDIES  --------------------------------------------------------------------------------              10.2   5.36  >-----------<  295      [04-25-20 @ 07:40]              35.2     153  |  111  |  28.0  ----------------------------<  123      [04-25-20 @ 19:07]  4.6   |  33.0  |  1.10        Ca     10.0     [04-25-20 @ 19:07]      iCa    1.33     [04-25 @ 23:10]      Phos  2.7     [04-25-20 @ 19:07]    TPro  x   /  Alb  3.1  /  TBili  x   /  DBili  x   /  AST  x   /  ALT  x   /  AlkPhos  x   [04-25-20 @ 19:07]              [04-25-20 @ 07:40]    Creatinine Trend:  SCr 1.10 [04-25 @ 19:07]  SCr 1.33 [04-25 @ 07:40]  SCr 1.87 [04-24 @ 06:58]  SCr 1.66 [04-23 @ 08:36]  SCr 1.18 [04-22 @ 11:33]    Urinalysis - [04-22-20 @ 00:59]      Color Yellow / Appearance Clear / SG 1.005 / pH 7.0      Gluc Negative / Ketone Negative  / Bili Negative / Urobili Negative       Blood Small / Protein 15 / Leuk Est Negative / Nitrite Negative      RBC 0-2 / WBC Negative / Hyaline  / Gran  / Sq Epi  / Non Sq Epi Occasional / Bacteria     Urine Sodium <30      [04-24-20 @ 13:41]  Urine Osmolality 262      [04-24-20 @ 13:41]    Ferritin 362      [04-25-20 @ 07:40]  PTH -- (Ca 9.7)      [04-25-20 @ 19:09]   73  TSH 0.21      [04-23-20 @ 14:28]    HCV 0.10, Nonreact      [04-21-20 @ 16:36]

## 2020-04-26 NOTE — PROGRESS NOTE ADULT - ASSESSMENT
66 yo F w/ hx bipolar disorder, intermittently nonverbal presents to ER for persistent fevers and new hypoxia (80's on room air) since being diagnosed with COVID week prior as outpatient. In the ED patient as noted to be hypoxic at 85% RA and improved with 100% o2 with 4 L NC.     #Acute metabolic/toxic encephalopathy  - Multifactorial, hypernatremia, metabolic encephalopathy, possible catatonia  - CT head neg  - EEG with epileptiform activity but no clinical seizure. Continue trileptal and keppra 500mg BID  - Neuro following, recs appreciated  - Psych consult appreciated, c/w remeron, ativan, zyprexa, trileptal  - on cogentin  - NGT in place for feeds  - will increase ativan to TID today 1mg    #Acute hypoxic resp failure due to COVID19  - Improving  - Completed Plaquenil (last dose today)  - Rise in inflammatory markers on 4/21. 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, lithium induced  - ALVARO improving  - sodium improving  - she was started on DDAVP  - continue with fluids and free water  - nephrology following    #Bipolar disorder  - Continue home meds; for years was on lithium which controlled symptoms but was stopped due to kidney damage; she has struggled to treat bipolar since then  - she is no longer on lithium  - Psych consult appreciated, continue Remeron, Trileptal, Ativan and Zyprexa  - ativan as above    #Hypothyroid  - C/w synthroid    #Dysphagia  - C/w tube feeds until more cooperative with swallowing  - S&S following     #Malnourished  - plan as above    #DVT ppx - lovenox daily    DISPO: hopeful to discharge home with O2 to resume 24 hour home aids. Patient still with desaturation with movement even on 2 LPM (to 84%) will need to continue efforts to improve mental status and eventual get more mobile before discharge home.

## 2020-04-26 NOTE — PROGRESS NOTE ADULT - SUBJECTIVE AND OBJECTIVE BOX
CC: hypoxia (24 Apr 2020 12:03)    INTERVAL HPI/OVERNIGHT EVENTS:  patient overnight without acute events  tolerating NGT  minimal response    Vital Signs Last 24 Hrs  T(C): 37.3 (26 Apr 2020 00:20), Max: 37.3 (26 Apr 2020 00:20)  T(F): 99.1 (26 Apr 2020 00:20), Max: 99.1 (26 Apr 2020 00:20)  HR: 100 (26 Apr 2020 00:20) (78 - 100)  BP: 156/69 (26 Apr 2020 00:20) (126/76 - 156/69)  BP(mean): --  RR: 19 (26 Apr 2020 00:20) (19 - 19)  SpO2: 92% (26 Apr 2020 08:03) (92% - 96%)    General: in no acute distress; moving freely, less rigid, but still with tremor which is baseline  ENMT: No nasal discharge; airway clear; edentulous; NGT in place  Gastrointestinal: Soft non-tender non-distended; Normal bowel sounds  Extremities: Normal range of motion, No clubbing, cyanosis or edema  Vascular: Peripheral pulses palpable 2+ bilaterally  Neurological: Alert at times during exam and resists noxious stimuli.    04-25    153<H>  |  111<H>  |  28.0<H>  ----------------------------<  123<H>  4.6   |  33.0<H>  |  1.10    Ca    10.0      25 Apr 2020 19:07  Phos  2.7     04-25    TPro  x   /  Alb  3.1<L>  /  TBili  x   /  DBili  x   /  AST  x   /  ALT  x   /  AlkPhos  x   04-25                          10.2   5.36  )-----------( 295      ( 25 Apr 2020 07:40 )             35.2     MEDICATIONS  (STANDING):  benztropine 0.5 milliGRAM(s) Oral two times a day  cyanocobalamin 1000 MICROGram(s) Oral daily  desmopressin Injectable 2 MICROGram(s) IV Push every 12 hours  dextrose 5%. 1000 milliLiter(s) (125 mL/Hr) IV Continuous <Continuous>  enoxaparin Injectable 40 milliGRAM(s) SubCutaneous daily  levothyroxine 75 MICROGram(s) Oral daily  LORazepam     Tablet 0.5 milliGRAM(s) Oral two times a day  mirtazapine 15 milliGRAM(s) Oral at bedtime  OLANZapine 5 milliGRAM(s) Oral two times a day  OXcarbazepine 300 milliGRAM(s) Oral two times a day  polyethylene glycol 3350 17 Gram(s) Oral daily  senna 2 Tablet(s) Oral at bedtime    MEDICATIONS  (PRN):  acetaminophen  Suppository .. 650 milliGRAM(s) Rectal every 6 hours PRN Temp greater or equal to 38C (100.4F), Mild Pain (1 - 3), Moderate Pain (4 - 6)  ALBUTerol    90 MICROgram(s) HFA Inhaler 2 Puff(s) Inhalation every 6 hours PRN Shortness of Breath and/or Wheezing  bisacodyl Suppository 10 milliGRAM(s) Rectal daily PRN Constipation  guaiFENesin   Syrup  (Sugar-Free) 200 milliGRAM(s) Oral every 6 hours PRN Cough      RADIOLOGY & ADDITIONAL TESTS:

## 2020-04-27 DIAGNOSIS — F31.9 BIPOLAR DISORDER, UNSPECIFIED: ICD-10-CM

## 2020-04-27 LAB
ANION GAP SERPL CALC-SCNC: 11 MMOL/L — SIGNIFICANT CHANGE UP (ref 5–17)
BUN SERPL-MCNC: 26 MG/DL — HIGH (ref 8–20)
CALCIUM SERPL-MCNC: 10.2 MG/DL — SIGNIFICANT CHANGE UP (ref 8.6–10.2)
CHLORIDE SERPL-SCNC: 109 MMOL/L — HIGH (ref 98–107)
CO2 SERPL-SCNC: 31 MMOL/L — HIGH (ref 22–29)
CREAT SERPL-MCNC: 1 MG/DL — SIGNIFICANT CHANGE UP (ref 0.5–1.3)
GLUCOSE SERPL-MCNC: 107 MG/DL — HIGH (ref 70–99)
HCT VFR BLD CALC: 32 % — LOW (ref 34.5–45)
HGB BLD-MCNC: 9.6 G/DL — LOW (ref 11.5–15.5)
MCHC RBC-ENTMCNC: 30 GM/DL — LOW (ref 32–36)
MCHC RBC-ENTMCNC: 30.3 PG — SIGNIFICANT CHANGE UP (ref 27–34)
MCV RBC AUTO: 100.9 FL — HIGH (ref 80–100)
PLATELET # BLD AUTO: 295 K/UL — SIGNIFICANT CHANGE UP (ref 150–400)
POTASSIUM SERPL-MCNC: 4.8 MMOL/L — SIGNIFICANT CHANGE UP (ref 3.5–5.3)
POTASSIUM SERPL-SCNC: 4.8 MMOL/L — SIGNIFICANT CHANGE UP (ref 3.5–5.3)
RBC # BLD: 3.17 M/UL — LOW (ref 3.8–5.2)
RBC # FLD: 13.7 % — SIGNIFICANT CHANGE UP (ref 10.3–14.5)
SODIUM SERPL-SCNC: 151 MMOL/L — HIGH (ref 135–145)
WBC # BLD: 6.21 K/UL — SIGNIFICANT CHANGE UP (ref 3.8–10.5)
WBC # FLD AUTO: 6.21 K/UL — SIGNIFICANT CHANGE UP (ref 3.8–10.5)

## 2020-04-27 PROCEDURE — 99233 SBSQ HOSP IP/OBS HIGH 50: CPT | Mod: CS

## 2020-04-27 PROCEDURE — 99233 SBSQ HOSP IP/OBS HIGH 50: CPT

## 2020-04-27 RX ORDER — OLANZAPINE 15 MG/1
5 TABLET, FILM COATED ORAL AT BEDTIME
Refills: 0 | Status: DISCONTINUED | OUTPATIENT
Start: 2020-04-27 | End: 2020-05-05

## 2020-04-27 RX ADMIN — SODIUM CHLORIDE 125 MILLILITER(S): 9 INJECTION, SOLUTION INTRAVENOUS at 10:20

## 2020-04-27 RX ADMIN — Medication 1 MILLIGRAM(S): at 14:19

## 2020-04-27 RX ADMIN — Medication 75 MICROGRAM(S): at 05:05

## 2020-04-27 RX ADMIN — Medication 10 MILLIGRAM(S): at 23:51

## 2020-04-27 RX ADMIN — OLANZAPINE 5 MILLIGRAM(S): 15 TABLET, FILM COATED ORAL at 23:50

## 2020-04-27 RX ADMIN — Medication 0.5 MILLIGRAM(S): at 17:09

## 2020-04-27 RX ADMIN — ENOXAPARIN SODIUM 40 MILLIGRAM(S): 100 INJECTION SUBCUTANEOUS at 14:19

## 2020-04-27 RX ADMIN — PREGABALIN 1000 MICROGRAM(S): 225 CAPSULE ORAL at 14:12

## 2020-04-27 RX ADMIN — Medication 0.5 MILLIGRAM(S): at 05:05

## 2020-04-27 RX ADMIN — POLYETHYLENE GLYCOL 3350 17 GRAM(S): 17 POWDER, FOR SOLUTION ORAL at 14:19

## 2020-04-27 RX ADMIN — DESMOPRESSIN ACETATE 2 MICROGRAM(S): 0.1 TABLET ORAL at 05:06

## 2020-04-27 RX ADMIN — Medication 1 MILLIGRAM(S): at 05:05

## 2020-04-27 RX ADMIN — Medication 1 MILLIGRAM(S): at 23:49

## 2020-04-27 RX ADMIN — OLANZAPINE 5 MILLIGRAM(S): 15 TABLET, FILM COATED ORAL at 05:05

## 2020-04-27 RX ADMIN — SENNA PLUS 2 TABLET(S): 8.6 TABLET ORAL at 23:50

## 2020-04-27 RX ADMIN — SODIUM CHLORIDE 125 MILLILITER(S): 9 INJECTION, SOLUTION INTRAVENOUS at 02:36

## 2020-04-27 RX ADMIN — OXCARBAZEPINE 300 MILLIGRAM(S): 300 TABLET, FILM COATED ORAL at 17:09

## 2020-04-27 RX ADMIN — MIRTAZAPINE 15 MILLIGRAM(S): 45 TABLET, ORALLY DISINTEGRATING ORAL at 23:50

## 2020-04-27 RX ADMIN — OXCARBAZEPINE 300 MILLIGRAM(S): 300 TABLET, FILM COATED ORAL at 05:05

## 2020-04-27 NOTE — PROGRESS NOTE ADULT - SUBJECTIVE AND OBJECTIVE BOX
CC: hypoxia (24 Apr 2020 12:03)    INTERVAL HPI/OVERNIGHT EVENTS: No overnight events    Pt seen and examined at bedside  Pt laying in bed  Does not interact much with exam. Tells me her name otherwise does not respond to questions  Unable to obtain ROS  VSS at bedside, on 4L NC however cannula not in nares..   NGT feeds running     Vital Signs Last 24 Hrs  T(C): 36.8 (27 Apr 2020 08:54), Max: 37.3 (27 Apr 2020 01:22)  T(F): 98.2 (27 Apr 2020 08:54), Max: 99.1 (27 Apr 2020 01:22)  HR: 76 (27 Apr 2020 08:54) (76 - 98)  BP: 95/60 (27 Apr 2020 08:54) (95/60 - 127/80)  BP(mean): --  RR: 20 (27 Apr 2020 08:54) (19 - 20)  SpO2: 94% (27 Apr 2020 01:22) (88% - 94%) on 4L NC    PHYSICAL EXAM  General: In NAD, laying in bed, still with tremor  ENMT: NGT & NC in place  Gastrointestinal: Soft non-tender non-distended; Normal bowel sounds  Extremities: Normal range of motion, No clubbing, cyanosis or edema  Vascular: Peripheral pulses palpable 2+ bilaterally  Neurological: Oriented to name, does not further interact with exam.    LABS/IMAGING: Reviewed

## 2020-04-27 NOTE — GOALS OF CARE CONVERSATION - ADVANCED CARE PLANNING - CONVERSATION DETAILS
ROGER contacted dgt Perla to introduce palliative care services and offer support in coping with patients hospitalization. Perla reports that she spoke on facetime with patient today for 35 minutes. Although previously MD had concerns dgt reports patient coherent, still alittle tired and depressed with current situation but dgt feels patient will do better going home and declines rehab option. patient lives in apartment with 24 hr aides. Dgt reports that she feels patient mental status is OK and issues with eating will be rectified when she returns home. Daughter states she does not want to pursue Peg placement. Palliative care to follow for additional family supports and symptom management.

## 2020-04-27 NOTE — PROGRESS NOTE ADULT - ASSESSMENT
64 yo F w/ hx bipolar disorder, intermittently nonverbal presents to ER for persistent fevers and new hypoxia (80's on room air) since being diagnosed with COVID week prior as outpatient. In the ED patient as noted to be hypoxic at 85% RA and improved with 100% o2 with 4 L NC.     1. Acute metabolic/toxic encephalopathy  - Multifactorial, hypernatremia, metabolic encephalopathy, possible catatonia  - CT head neg  - EEG with epileptiform activity but no clinical seizure. Continue trileptal and keppra 500mg BID  - Neuro following, recs appreciated  - Psych consult appreciated, c/w remeron, ativan (increased to TID), zyprexa, trileptal  - On cogentin  - NGT in place for feeds    2. Acute hypoxic resp failure due to COVID19  - Improving  - Completed Plaquenil course  - Inflammatory markers downtrending, monitor     3. 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 Bipolar disorder  - Continue home meds; for years was on lithium which controlled symptoms but was stopped due to kidney damage; she has struggled to treat bipolar since then  - She is no longer on lithium  - Psych consult appreciated, continue Remeron, Trileptal, Ativan and Zyprexa    5. Hypothyroid  - C/w synthroid    6. Dysphagia  - C/w tube feeds until more cooperative with swallowing. Day 5 of NGT. Will need to discuss more permanent means of feeds if NPO status continues..   - S&S following     7. Malnourished  - Plan as above    #DVT ppx - lovenox daily    DISPO: Hopeful to discharge home with O2 to resume 24 hour home aids. Need to wean supplemental O2 and determine means of nutrition prior to DC. 64 yo F w/ hx bipolar disorder, intermittently nonverbal presents to ER for persistent fevers and new hypoxia (80's on room air) since being diagnosed with COVID week prior as outpatient. In the ED patient as noted to be hypoxic at 85% RA and improved with 100% o2 with 4 L NC.     1. Acute metabolic/toxic encephalopathy  - Multifactorial, hypernatremia, metabolic encephalopathy, possible catatonia  - CT head neg  - EEG with epileptiform activity but no clinical seizure. Continue trileptal and keppra 500mg BID  - Neuro following, recs appreciated  - Psych consult appreciated, c/w remeron, ativan (increased to TID), zyprexa, trileptal. Will reconsult psych  - On cogentin  - NGT in place for feeds    2. Acute hypoxic resp failure due to COVID19  - Improving  - Completed Plaquenil course  - Inflammatory markers downtrending, monitor     3. 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 Bipolar disorder  - Continue home meds; for years was on lithium which controlled symptoms but was stopped due to kidney damage; she has struggled to treat bipolar since then  - She is no longer on lithium  - Psych consult appreciated, continue Remeron, Trileptal, Ativan and Zyprexa    5. Hypothyroid  - C/w synthroid    6. Dysphagia  - C/w tube feeds until more cooperative with swallowing. Day 5 of NGT. Will need to discuss more permanent means of feeds if NPO status continues..   - S&S following     7. Malnourished  - Plan as above    #DVT ppx - lovenox daily    DISPO: Hopeful to discharge home with O2 to resume 24 hour home aids. Need to wean supplemental O2 and determine means of nutrition prior to DC. 66 yo F w/ hx bipolar disorder, intermittently nonverbal presents to ER for persistent fevers and new hypoxia (80's on room air) since being diagnosed with COVID week prior as outpatient. In the ED patient as noted to be hypoxic at 85% RA and improved with 100% o2 with 4 L NC.     1. Acute metabolic/toxic encephalopathy  - Multifactorial, hypernatremia, metabolic encephalopathy, possible catatonia  - CT head neg  - EEG with epileptiform activity but no clinical seizure. Continue trileptal and keppra 500mg BID  - Neuro following, recs appreciated  - Psych consult appreciated, c/w remeron, ativan (increased to TID), zyprexa, trileptal. Will reconsult psych  - On cogentin  - NGT in place for feeds    2. Acute hypoxic resp failure due to COVID19  - Improving  - Completed Plaquenil course  - Inflammatory markers downtrending, monitor     3. 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 Bipolar disorder  - Continue home meds; for years was on lithium which controlled symptoms but was stopped due to kidney damage; she has struggled to treat bipolar since then  - She is no longer on lithium  - Psych consult appreciated, continue Remeron, Trileptal, Ativan and Zyprexa    5. Hypothyroid  - C/w synthroid    6. Dysphagia  - C/w tube feeds until more cooperative with swallowing. Day 5 of NGT. Will need to discuss more permanent means of feeds if NPO status continues.. Will consult GI  - S&S following     7. Malnourished  - Plan as above    #DVT ppx - lovenox daily    DISPO: Hopeful to discharge home with O2 to resume 24 hour home aids. Need to wean supplemental O2 and determine means of nutrition prior to DC.

## 2020-04-27 NOTE — PROGRESS NOTE ADULT - ASSESSMENT
1. Hypernatremia (SNa+ 140--> 161)  2. ALVARO: likely ATN in setting of COVID 19+- resolved  3. COVID 19+     Hypernatremia in setting of poor PO intake  Pt also on Lithium in past; low Urine Osm; ? NDI- pt however not polyuric  s/p DDAVP 2 doses; SNa+ improving slowly  Continue free water + hypotonic IVF  Monitor Scr, lytes and UOP

## 2020-04-27 NOTE — PROGRESS NOTE ADULT - SUBJECTIVE AND OBJECTIVE BOX
[facility] DIVISION OF KIDNEY DISEASES AND HYPERTENSION -- FOLLOW UP NOTE  --------------------------------------------------------------------------------  Chief Complaint:    24 hour events/subjective:        PAST HISTORY  --------------------------------------------------------------------------------  No significant changes to PMH, PSH, FHx, SHx, unless otherwise noted    ALLERGIES & MEDICATIONS  --------------------------------------------------------------------------------  Allergies    Depakote (Hives)  lithium (Hives)    Intolerances      Standing Inpatient Medications  benztropine 0.5 milliGRAM(s) Oral two times a day  cyanocobalamin 1000 MICROGram(s) Oral daily  dextrose 5%. 1000 milliLiter(s) IV Continuous <Continuous>  enoxaparin Injectable 40 milliGRAM(s) SubCutaneous daily  folic acid 1 milliGRAM(s) Oral daily  levothyroxine 75 MICROGram(s) Oral daily  LORazepam     Tablet 1 milliGRAM(s) Oral three times a day  mirtazapine 15 milliGRAM(s) Oral at bedtime  OLANZapine 5 milliGRAM(s) Oral at bedtime  OXcarbazepine 300 milliGRAM(s) Oral two times a day  polyethylene glycol 3350 17 Gram(s) Oral daily  senna 2 Tablet(s) Oral at bedtime    PRN Inpatient Medications  acetaminophen  Suppository .. 650 milliGRAM(s) Rectal every 6 hours PRN  ALBUTerol    90 MICROgram(s) HFA Inhaler 2 Puff(s) Inhalation every 6 hours PRN  bisacodyl Suppository 10 milliGRAM(s) Rectal daily PRN  guaiFENesin   Syrup  (Sugar-Free) 200 milliGRAM(s) Oral every 6 hours PRN      REVIEW OF SYSTEMS  --------------------------------------------------------------------------------  Gen: No weight changes, fatigue, fevers/chills, weakness  Skin: No rashes  Head/Eyes/Ears/Mouth: No headache; Normal hearing; Normal vision w/o blurriness; No sinus pain/discomfort, sore throat  Respiratory: No dyspnea, cough, wheezing, hemoptysis  CV: No chest pain, PND, orthopnea  GI: No abdominal pain, diarrhea, constipation, nausea, vomiting, melena, hematochezia  : No increased frequency, dysuria, hematuria, nocturia  MSK: No joint pain/swelling; no back pain; no edema  Neuro: No dizziness/lightheadedness, weakness, seizures, numbness, tingling  Heme: No easy bruising or bleeding  Endo: No heat/cold intolerance  Psych: No significant nervousness, anxiety, stress, depression    All other systems were reviewed and are negative, except as noted.    VITALS/PHYSICAL EXAM  --------------------------------------------------------------------------------  T(C): 36.8 (04-27-20 @ 08:54), Max: 37.3 (04-27-20 @ 01:22)  HR: 76 (04-27-20 @ 08:54) (76 - 98)  BP: 95/60 (04-27-20 @ 08:54) (95/60 - 101/58)  RR: 20 (04-27-20 @ 08:54) (19 - 20)  SpO2: 93% (04-27-20 @ 10:38) (88% - 94%)  Wt(kg): --        04-26-20 @ 07:01  -  04-27-20 @ 07:00  --------------------------------------------------------  IN: 3975 mL / OUT: 1700 mL / NET: 2275 mL    04-27-20 @ 07:01  -  04-27-20 @ 12:36  --------------------------------------------------------  IN: 1350 mL / OUT: 0 mL / NET: 1350 mL      Physical Exam:  	Gen: NAD, well-appearing  	HEENT: PERRL, supple neck, clear oropharynx  	Pulm: CTA B/L  	CV: RRR, S1S2; no rub  	Back: No spinal or CVA tenderness; no sacral edema  	Abd: +BS, soft, nontender/nondistended  	: No suprapubic tenderness  	UE: Warm, FROM, no clubbing, intact strength; no edema; no asterixis  	LE: Warm, FROM, no clubbing, intact strength; no edema  	Neuro: No focal deficits, intact gait  	Psych: Normal affect and mood  	Skin: Warm, without rashes  	Vascular access:    LABS/STUDIES  --------------------------------------------------------------------------------              9.6    6.21  >-----------<  295      [04-27-20 @ 06:25]              32.0     151  |  109  |  26.0  ----------------------------<  107      [04-27-20 @ 06:25]  4.8   |  31.0  |  1.00        Ca     10.2     [04-27-20 @ 06:25]      iCa    1.33     [04-25 @ 23:10]      Phos  2.7     [04-25-20 @ 19:07]    TPro  x   /  Alb  3.1  /  TBili  x   /  DBili  x   /  AST  x   /  ALT  x   /  AlkPhos  x   [04-25-20 @ 19:07]          Creatinine Trend:  SCr 1.00 [04-27 @ 06:25]  SCr 1.01 [04-26 @ 10:49]  SCr 1.10 [04-25 @ 19:07]  SCr 1.33 [04-25 @ 07:40]  SCr 1.87 [04-24 @ 06:58]    Urinalysis - [04-22-20 @ 00:59]      Color Yellow / Appearance Clear / SG 1.005 / pH 7.0      Gluc Negative / Ketone Negative  / Bili Negative / Urobili Negative       Blood Small / Protein 15 / Leuk Est Negative / Nitrite Negative      RBC 0-2 / WBC Negative / Hyaline  / Gran  / Sq Epi  / Non Sq Epi Occasional / Bacteria     Urine Sodium <30      [04-24-20 @ 13:41]  Urine Osmolality 262      [04-24-20 @ 13:41]    Ferritin 362      [04-25-20 @ 07:40]  PTH -- (Ca 9.7)      [04-25-20 @ 19:09]   73  TSH 0.21      [04-23-20 @ 14:28]    HCV 0.10, Nonreact      [04-21-20 @ 16:36] [facility] DIVISION OF KIDNEY DISEASES AND HYPERTENSION -- FOLLOW UP NOTE  --------------------------------------------------------------------------------  Chief Complaint: hypernatremia    24 hour events/subjective:  no acute event  pt unable to provide ROS        PAST HISTORY  --------------------------------------------------------------------------------  No significant changes to PMH, PSH, FHx, SHx, unless otherwise noted    ALLERGIES & MEDICATIONS  --------------------------------------------------------------------------------  Allergies    Depakote (Hives)  lithium (Hives)    Intolerances      Standing Inpatient Medications  benztropine 0.5 milliGRAM(s) Oral two times a day  cyanocobalamin 1000 MICROGram(s) Oral daily  dextrose 5%. 1000 milliLiter(s) IV Continuous <Continuous>  enoxaparin Injectable 40 milliGRAM(s) SubCutaneous daily  folic acid 1 milliGRAM(s) Oral daily  levothyroxine 75 MICROGram(s) Oral daily  LORazepam     Tablet 1 milliGRAM(s) Oral three times a day  mirtazapine 15 milliGRAM(s) Oral at bedtime  OLANZapine 5 milliGRAM(s) Oral at bedtime  OXcarbazepine 300 milliGRAM(s) Oral two times a day  polyethylene glycol 3350 17 Gram(s) Oral daily  senna 2 Tablet(s) Oral at bedtime    PRN Inpatient Medications  acetaminophen  Suppository .. 650 milliGRAM(s) Rectal every 6 hours PRN  ALBUTerol    90 MICROgram(s) HFA Inhaler 2 Puff(s) Inhalation every 6 hours PRN  bisacodyl Suppository 10 milliGRAM(s) Rectal daily PRN  guaiFENesin   Syrup  (Sugar-Free) 200 milliGRAM(s) Oral every 6 hours PRN      REVIEW OF SYSTEMS  --------------------------------------------------------------------------------  unable to obtain    VITALS/PHYSICAL EXAM  --------------------------------------------------------------------------------  T(C): 36.8 (04-27-20 @ 08:54), Max: 37.3 (04-27-20 @ 01:22)  HR: 76 (04-27-20 @ 08:54) (76 - 98)  BP: 95/60 (04-27-20 @ 08:54) (95/60 - 101/58)  RR: 20 (04-27-20 @ 08:54) (19 - 20)  SpO2: 93% (04-27-20 @ 10:38) (88% - 94%)  Wt(kg): --        04-26-20 @ 07:01  -  04-27-20 @ 07:00  --------------------------------------------------------  IN: 3975 mL / OUT: 1700 mL / NET: 2275 mL    04-27-20 @ 07:01  -  04-27-20 @ 12:36  --------------------------------------------------------  IN: 1350 mL / OUT: 0 mL / NET: 1350 mL      Physical Exam:  	Due to the nature of the pt's isolation status, no bedside physical exam done to limit spread of infection.  Objective data was reviewed in detail.      LABS/STUDIES  --------------------------------------------------------------------------------              9.6    6.21  >-----------<  295      [04-27-20 @ 06:25]              32.0     151  |  109  |  26.0  ----------------------------<  107      [04-27-20 @ 06:25]  4.8   |  31.0  |  1.00        Ca     10.2     [04-27-20 @ 06:25]      iCa    1.33     [04-25 @ 23:10]      Phos  2.7     [04-25-20 @ 19:07]    TPro  x   /  Alb  3.1  /  TBili  x   /  DBili  x   /  AST  x   /  ALT  x   /  AlkPhos  x   [04-25-20 @ 19:07]          Creatinine Trend:  SCr 1.00 [04-27 @ 06:25]  SCr 1.01 [04-26 @ 10:49]  SCr 1.10 [04-25 @ 19:07]  SCr 1.33 [04-25 @ 07:40]  SCr 1.87 [04-24 @ 06:58]    Urinalysis - [04-22-20 @ 00:59]      Color Yellow / Appearance Clear / SG 1.005 / pH 7.0      Gluc Negative / Ketone Negative  / Bili Negative / Urobili Negative       Blood Small / Protein 15 / Leuk Est Negative / Nitrite Negative      RBC 0-2 / WBC Negative / Hyaline  / Gran  / Sq Epi  / Non Sq Epi Occasional / Bacteria     Urine Sodium <30      [04-24-20 @ 13:41]  Urine Osmolality 262      [04-24-20 @ 13:41]    Ferritin 362      [04-25-20 @ 07:40]  PTH -- (Ca 9.7)      [04-25-20 @ 19:09]   73  TSH 0.21      [04-23-20 @ 14:28]    HCV 0.10, Nonreact      [04-21-20 @ 16:36]

## 2020-04-27 NOTE — PROGRESS NOTE ADULT - PROBLEM SELECTOR PLAN 2
discussed reduction of psychotropic medications given depressed sensorium discussed reduction of psychotropic medications given depressed sensorium  will reduce olanzapine to 5 mg at bedtime only

## 2020-04-27 NOTE — PROGRESS NOTE ADULT - SUBJECTIVE AND OBJECTIVE BOX
seen via telehealth Provider in [facility]  chart reviewed and issues discussed w Dr Lim   65 yr old female with h/o bipolar disorder past treatment w lithium - nephrotoxicity Depakote - unknown reaction now on combination of meds Since presentation on 4/20 with depressed sensorium non verbal Presently curled in bed no response to light tactile stimuli  Dr Lim reports patient resides in her own apartment with 24 / 7 aide - baseline mental state unknown  over last week seems to be more withdrawn Feed via NG tube metabolic derangements persist CXR  last done 4/24 showed increase in lung opacification from COVID-19 viral pneumonia  Unable to obtain ROS due to mental state  Vital Signs Last 24 Hrs  T(C): 36.8 (27 Apr 2020 08:54), Max: 37.3 (27 Apr 2020 01:22)  T(F): 98.2 (27 Apr 2020 08:54), Max: 99.1 (27 Apr 2020 01:22)  HR: 76 (27 Apr 2020 08:54) (76 - 98)  BP: 95/60 (27 Apr 2020 08:54) (95/60 - 101/58)  BP(mean): --  RR: 20 (27 Apr 2020 08:54) (19 - 20)  SpO2: 93% (27 Apr 2020 10:38) (88% - 94%)                        9.6    6.21  )-----------( 295      ( 27 Apr 2020 06:25 )             32.0     04-27    151<H>  |  109<H>  |  26.0<H>  ----------------------------<  107<H>  4.8   |  31.0<H>  |  1.00    Ca    10.2      27 Apr 2020 06:25  Phos  2.7     04-25    TPro  x   /  Alb  3.1<L>  /  TBili  x   /  DBili  x   /  AST  x   /  ALT  x   /  AlkPhos  x   04-25    LIVER FUNCTIONS - ( 25 Apr 2020 19:07 )  Alb: 3.1 g/dL / Pro: x     / ALK PHOS: x     / ALT: x     / AST: x     / GGT: x         < from: CT Head No Cont (04.23.20 @ 16:23) >  FINDINGS:       There is no evidence of intraparenchymal or extraaxial hemorrhage.   There is no CT evidence of large vessel acute infarct. No mass effect is found in the brain.  No evidence of midline shift or herniation pattern.    The ventricles, sulci and basal cisterns appear unremarkable.    Visualized paranasal sinuses are clear.      IMPRESSION:       No acute intracranial findings.    < end of copied text >  PAST MEDICAL & SURGICAL HISTORY:  Hypothyroid  Bipolar 1 disorder  No significant past surgical history  MEDICATIONS  (STANDING):  benztropine 0.5 milliGRAM(s) Oral two times a day  cyanocobalamin 1000 MICROGram(s) Oral daily  dextrose 5%. 1000 milliLiter(s) (125 mL/Hr) IV Continuous <Continuous>  enoxaparin Injectable 40 milliGRAM(s) SubCutaneous daily  folic acid 1 milliGRAM(s) Oral daily  levothyroxine 75 MICROGram(s) Oral daily  LORazepam     Tablet 1 milliGRAM(s) Oral three times a day  mirtazapine 15 milliGRAM(s) Oral at bedtime  OLANZapine 5 milliGRAM(s) Oral two times a day  OXcarbazepine 300 milliGRAM(s) Oral two times a day  polyethylene glycol 3350 17 Gram(s) Oral daily  senna 2 Tablet(s) Oral at bedtime    MEDICATIONS  (PRN):  acetaminophen  Suppository .. 650 milliGRAM(s) Rectal every 6 hours PRN Temp greater or equal to 38C (100.4F), Mild Pain (1 - 3), Moderate Pain (4 - 6)  ALBUTerol    90 MICROgram(s) HFA Inhaler 2 Puff(s) Inhalation every 6 hours PRN Shortness of Breath and/or Wheezing  bisacodyl Suppository 10 milliGRAM(s) Rectal daily PRN Constipation  guaiFENesin   Syrup  (Sugar-Free) 200 milliGRAM(s) Oral every 6 hours PRN Cough

## 2020-04-28 DIAGNOSIS — Z51.5 ENCOUNTER FOR PALLIATIVE CARE: ICD-10-CM

## 2020-04-28 DIAGNOSIS — U07.1 COVID-19: ICD-10-CM

## 2020-04-28 DIAGNOSIS — R53.81 OTHER MALAISE: ICD-10-CM

## 2020-04-28 LAB
ANION GAP SERPL CALC-SCNC: 12 MMOL/L — SIGNIFICANT CHANGE UP (ref 5–17)
APPEARANCE UR: CLEAR — SIGNIFICANT CHANGE UP
BACTERIA # UR AUTO: ABNORMAL
BILIRUB UR-MCNC: NEGATIVE — SIGNIFICANT CHANGE UP
BUN SERPL-MCNC: 24 MG/DL — HIGH (ref 8–20)
CALCIUM SERPL-MCNC: 10.4 MG/DL — HIGH (ref 8.6–10.2)
CHLORIDE SERPL-SCNC: 100 MMOL/L — SIGNIFICANT CHANGE UP (ref 98–107)
CO2 SERPL-SCNC: 29 MMOL/L — SIGNIFICANT CHANGE UP (ref 22–29)
COLOR SPEC: YELLOW — SIGNIFICANT CHANGE UP
CREAT SERPL-MCNC: 0.99 MG/DL — SIGNIFICANT CHANGE UP (ref 0.5–1.3)
DIFF PNL FLD: NEGATIVE — SIGNIFICANT CHANGE UP
EPI CELLS # UR: SIGNIFICANT CHANGE UP
GLUCOSE SERPL-MCNC: 116 MG/DL — HIGH (ref 70–99)
GLUCOSE UR QL: NEGATIVE MG/DL — SIGNIFICANT CHANGE UP
KETONES UR-MCNC: NEGATIVE — SIGNIFICANT CHANGE UP
LEUKOCYTE ESTERASE UR-ACNC: ABNORMAL
NITRITE UR-MCNC: NEGATIVE — SIGNIFICANT CHANGE UP
OSMOLALITY UR: 148 MOSM/KG — LOW (ref 300–1000)
PH UR: 7 — SIGNIFICANT CHANGE UP (ref 5–8)
POTASSIUM SERPL-MCNC: 4.7 MMOL/L — SIGNIFICANT CHANGE UP (ref 3.5–5.3)
POTASSIUM SERPL-SCNC: 4.7 MMOL/L — SIGNIFICANT CHANGE UP (ref 3.5–5.3)
POTASSIUM UR-SCNC: 14 MMOL/L — SIGNIFICANT CHANGE UP
PROT UR-MCNC: NEGATIVE MG/DL — SIGNIFICANT CHANGE UP
RBC CASTS # UR COMP ASSIST: SIGNIFICANT CHANGE UP /HPF (ref 0–4)
SODIUM SERPL-SCNC: 141 MMOL/L — SIGNIFICANT CHANGE UP (ref 135–145)
SODIUM UR-SCNC: <30 MMOL/L — SIGNIFICANT CHANGE UP
SP GR SPEC: 1 — LOW (ref 1.01–1.02)
UROBILINOGEN FLD QL: NEGATIVE MG/DL — SIGNIFICANT CHANGE UP
WBC UR QL: SIGNIFICANT CHANGE UP

## 2020-04-28 PROCEDURE — 99221 1ST HOSP IP/OBS SF/LOW 40: CPT | Mod: CS

## 2020-04-28 PROCEDURE — 99232 SBSQ HOSP IP/OBS MODERATE 35: CPT | Mod: CS

## 2020-04-28 PROCEDURE — 99233 SBSQ HOSP IP/OBS HIGH 50: CPT | Mod: CS

## 2020-04-28 RX ORDER — SODIUM CHLORIDE 9 MG/ML
1000 INJECTION INTRAMUSCULAR; INTRAVENOUS; SUBCUTANEOUS
Refills: 0 | Status: DISCONTINUED | OUTPATIENT
Start: 2020-04-28 | End: 2020-04-28

## 2020-04-28 RX ADMIN — SODIUM CHLORIDE 125 MILLILITER(S): 9 INJECTION, SOLUTION INTRAVENOUS at 01:26

## 2020-04-28 RX ADMIN — Medication 0.5 MILLIGRAM(S): at 05:19

## 2020-04-28 RX ADMIN — ENOXAPARIN SODIUM 40 MILLIGRAM(S): 100 INJECTION SUBCUTANEOUS at 12:09

## 2020-04-28 RX ADMIN — OXCARBAZEPINE 300 MILLIGRAM(S): 300 TABLET, FILM COATED ORAL at 17:24

## 2020-04-28 RX ADMIN — Medication 0.5 MILLIGRAM(S): at 17:24

## 2020-04-28 RX ADMIN — Medication 1 MILLIGRAM(S): at 21:27

## 2020-04-28 RX ADMIN — OXCARBAZEPINE 300 MILLIGRAM(S): 300 TABLET, FILM COATED ORAL at 05:19

## 2020-04-28 RX ADMIN — Medication 1 MILLIGRAM(S): at 12:09

## 2020-04-28 RX ADMIN — OLANZAPINE 5 MILLIGRAM(S): 15 TABLET, FILM COATED ORAL at 21:43

## 2020-04-28 RX ADMIN — Medication 75 MICROGRAM(S): at 05:19

## 2020-04-28 RX ADMIN — PREGABALIN 1000 MICROGRAM(S): 225 CAPSULE ORAL at 12:09

## 2020-04-28 RX ADMIN — MIRTAZAPINE 15 MILLIGRAM(S): 45 TABLET, ORALLY DISINTEGRATING ORAL at 21:43

## 2020-04-28 RX ADMIN — Medication 1 MILLIGRAM(S): at 05:19

## 2020-04-28 NOTE — SWALLOW BEDSIDE ASSESSMENT ADULT - SLP GENERAL OBSERVATIONS
Pt received & seen seated upright in bed, +awake/alert, +poor cognition, +varied command following, +some one word responses, +NGT, 0/10 nonverbal pian scale

## 2020-04-28 NOTE — CONSULT NOTE ADULT - ASSESSMENT
This is a 65 year old female PMHX bipolar 1, hypothyroid, intermittent non verbal communication, admitted to [facility] for fevers and hypoxia. Patient recently diagnosed with COVID19 outpatient last week and noted to have poor PO intake, urine output, and fevers. No reports of chest pain, n/v/diarrhea, constipation. On room air patient saturated mid 80's and improved with supplemental oxygen. At home patient has 24 hour aides - Patient now on 6 [place] - called for palliative care consult.

## 2020-04-28 NOTE — SWALLOW BEDSIDE ASSESSMENT ADULT - ORAL PREPARATORY PHASE
Reduced oral grading/varied: pt benefits from increased time & verbal cues Reduced oral grading Reduced oral grading/Anterior loss of bolus/+inconsistent tongue thrust observed

## 2020-04-28 NOTE — PROGRESS NOTE ADULT - ASSESSMENT
64 yo F w/ hx bipolar disorder, intermittently nonverbal presents to ER for persistent fevers and new hypoxia (80's on room air) since being diagnosed with COVID week prior as outpatient. In the ED patient as noted to be hypoxic at 85% RA and improved with 100% o2 with 4 L NC.     1. Acute metabolic/toxic encephalopathy  - Multifactorial, hypernatremia, metabolic encephalopathy, possible catatonia  - Improving, today more awake/alert/conversing   - CT head neg  - EEG with epileptiform activity but no clinical seizure. Continue trileptal   - Neuro following, recs appreciated  - Psych consult appreciated, c/w Remeron ativan (increased to TID), zyprexa, trileptal. Zyprexa reduced to 5mg PO QD  - On cogentin  - NGT in place for feeds. Pt is more awake/alert today, S&S evaluation ordered. As per family discussion with palliative, will defer peg     2. Acute hypoxic resp failure due to COVID19  - Improving  - Completed Plaquenil course  - Inflammatory markers downtrending, monitor     3. ALVARO vs CKD with hypernatremia  - Likely CKD per daughter history, lithium induced (no longer on Lithium)  - ALVARO resolved, Cr normalized   - Sodium now resolved, Na 141 today  - S/p DDVAP and D5 IVF  - Nephrology following    4. Bipolar disorder  - Continue home meds; for years was on lithium which controlled symptoms but was stopped due to kidney damage; she has struggled to treat bipolar since then  - She is no longer on lithium  - Psych consult appreciated, continue Remeron, Trileptal, Ativan and Zyprexa. Zyprexa reduced to 5mg PO QD given decreased sensorium     5. Hypothyroid  - C/w synthroid    6. Dysphagia  - C/w tube feeds until more cooperative with swallowing. Today is da6 6 of NGT. Pt is more awake and alert today. Will have S&S evaluate patient.   - As per [facility] discussion with family/palliative yesterday, family does not want peg  - S&S following     7. Malnourished  - Plan as above    #DVT ppx - lovenox daily    DISPO: Hopeful to discharge home with O2 to resume 24 hour home aids. Need to wean supplemental O2 and determine means of nutrition prior to DC (S&S eval pending) 64 yo F w/ hx bipolar disorder, intermittently nonverbal presents to ER for persistent fevers and new hypoxia (80's on room air) since being diagnosed with COVID week prior as outpatient. In the ED patient as noted to be hypoxic at 85% RA and improved with 100% o2 with 4 L NC.     1. Acute metabolic/toxic encephalopathy  - Multifactorial, hypernatremia, metabolic encephalopathy, possible catatonia  - Improving, today more awake/alert/conversing   - CT head neg  - EEG with epileptiform activity but no clinical seizure. Continue trileptal   - Neuro following, recs appreciated  - Psych consult appreciated, c/w Remeron ativan (increased to TID), zyprexa, trileptal. Zyprexa reduced to 5mg PO QD  - On cogentin  - NGT in place for feeds. Pt is more awake/alert today, S&S evaluation ordered. As per family discussion with palliative, will defer peg     2. Acute hypoxic resp failure due to COVID19  - Improving  - Completed Plaquenil course  - Inflammatory markers downtrending, monitor     3. ALVARO vs CKD with hypernatremia  - Likely CKD per daughter history, lithium induced (no longer on Lithium)  - ALVARO resolved, Cr normalized   - Sodium now resolved, Na 141 today  - S/p DDVAP and D5 IVF  - Nephrology following    4. Bipolar disorder  - Continue home meds; for years was on lithium which controlled symptoms but was stopped due to kidney damage; she has struggled to treat bipolar since then  - She is no longer on lithium  - Psych consult appreciated, continue Remeron, Trileptal, Ativan and Zyprexa. Zyprexa reduced to 5mg PO QD given decreased sensorium     5. Hypothyroid  - C/w synthroid    6. Dysphagia  - On tube feeds, day 6 of NGT. Pt is more awake and alert today. Will have S&S evaluate patient.   - As per [facility] discussion with family/palliative yesterday, family does not want peg  - S&S following     7. Malnourished  - Plan as above    #DVT ppx - lovenox daily    DISPO: Hopeful to discharge home with O2 to resume 24 hour home aids. Need to wean supplemental O2 and determine means of nutrition prior to DC (S&S eval pending) 66 yo F w/ hx bipolar disorder, intermittently nonverbal presents to ER for persistent fevers and new hypoxia (80's on room air) since being diagnosed with COVID week prior as outpatient. In the ED patient as noted to be hypoxic at 85% RA and improved with 100% o2 with 4 L NC.     1. Acute metabolic/toxic encephalopathy  - Multifactorial, hypernatremia, metabolic encephalopathy, possible catatonia  - Improving, today more awake/alert/conversing   - CT head neg  - EEG with epileptiform activity but no clinical seizure. Continue trileptal   - Neuro following, recs appreciated  - Psych consult appreciated, c/w Remeron ativan (increased to TID), zyprexa, trileptal. Zyprexa reduced to 5mg PO QD  - On cogentin  - NGT in place for feeds. Pt is more awake/alert today, S&S evaluation ordered. As per family discussion with palliative, will defer peg     2. Acute hypoxic resp failure due to COVID19  - Improving  - Completed Plaquenil course  - Inflammatory markers downtrending, monitor     3. ALVARO vs CKD with hypernatremia  - Likely CKD per daughter history, lithium induced (no longer on Lithium)  - ALVARO resolved, Cr normalized   - Sodium now resolved, Na 141 today  - S/p DDVAP and D5 IVF  - Nephrology following    4. Bipolar disorder  - Continue home meds; for years was on lithium which controlled symptoms but was stopped due to kidney damage; she has struggled to treat bipolar since then  - She is no longer on lithium  - Psych consult appreciated, continue Remeron, Trileptal, Ativan and Zyprexa. Zyprexa reduced to 5mg PO QD given decreased sensorium     5. Hypothyroid  - C/w synthroid    6. Dysphagia  - On tube feeds, day 6 of NGT. Pt is more awake and alert today. Will have S&S evaluate patient.   - As per [facility] discussion with family/palliative yesterday, family does not want peg  - S&S following     7. Malnourished  - Plan as above    #DVT ppx - lovenox daily    DISPO: Hopeful to discharge home with O2 to resume 24 hour home aids. Need to wean supplemental O2 and determine means of nutrition prior to DC (S&S eval pending). Seen by PT, home w/ assist

## 2020-04-28 NOTE — CONSULT NOTE ADULT - SUBJECTIVE AND OBJECTIVE BOX
This is a Palliative Care Consult  HPI This is a 65 year old female PMHX bipolar 1, hypothyroid, intermittent non verbal communication, admitted to [facility] for fevers and hypoxia. Patient recently diagnosed with COVID19 outpatient last week and noted to have poor PO intake, urine output, and fevers. No reports of chest pain, n/v/diarrhea, constipation. On room air patient saturated mid 80's and improved with supplemental oxygen. At home patient has 24 hour aides - Patient now on [address] [place] - called for palliative care consult to discuss goals.    <HPI:  66 yo F w/ hx bipolar disorder, intermittently nonverbal presents to ER for persistent fevers and new hypoxia (80's on room air) since being diagnosed with COVID week prior as outpatient.  per aide at bedside patient noted to have poor PO intake, poor urine output and fevers/hypoxia. been checking pulse ox intermittently. denies nausea/vomiting or diarrhea. +BM's.   at baseline, ambulates and able to feed self.   ros unable to obtain as patient not responding to questions/commands  in Er , hypoxic at 85% RA and improved with 100% o2 (20 Apr 2020 18:00)> end of copied text      PERTINENT PMH REVIEWED: Yes   PAST MEDICAL & SURGICAL HISTORY:  Hypothyroid  Bipolar 1 disorder  No significant past surgical history      SOCIAL HISTORY:                                     Admitted from:  home     Surrogate/HCP/Guardian: Phone#:    FAMILY HISTORY:  No pertinent family history in first degree relatives    Father:  Mother:   No family history related to admission diagonsis    Baseline ADLs (prior to admission):  Dependent      Allergies    Depakote (Hives)  lithium (Hives)    Present Symptoms: Unable to obtain due to poor mentation   Dyspnea: 0   Nausea/Vomiting: Unable to obtain due to poor mentation   Anxiety:  Unable to obtain due to poor mentation   Depression: Unable to obtain due to poor mentation   Fatigue: Yes   Loss of appetite: Yes     Pain: Appears comfortable. No nonverbal s/s of pain present            Character-            Duration-            Effect-            Factors-            Frequency-            Location-            Severity-    Review of Systems: Reviewed  Per HPI, all other ROS negative  Unable to obtain due to poor mentation       MEDICATIONS  (STANDING):  benztropine 0.5 milliGRAM(s) Oral two times a day  cyanocobalamin 1000 MICROGram(s) Oral daily  enoxaparin Injectable 40 milliGRAM(s) SubCutaneous daily  folic acid 1 milliGRAM(s) Oral daily  levothyroxine 75 MICROGram(s) Oral daily  LORazepam     Tablet 1 milliGRAM(s) Oral three times a day  mirtazapine 15 milliGRAM(s) Oral at bedtime  OLANZapine 5 milliGRAM(s) Oral at bedtime  OXcarbazepine 300 milliGRAM(s) Oral two times a day  polyethylene glycol 3350 17 Gram(s) Oral daily  senna 2 Tablet(s) Oral at bedtime    MEDICATIONS  (PRN):  acetaminophen  Suppository .. 650 milliGRAM(s) Rectal every 6 hours PRN Temp greater or equal to 38C (100.4F), Mild Pain (1 - 3), Moderate Pain (4 - 6)  ALBUTerol    90 MICROgram(s) HFA Inhaler 2 Puff(s) Inhalation every 6 hours PRN Shortness of Breath and/or Wheezing  bisacodyl Suppository 10 milliGRAM(s) Rectal daily PRN Constipation  guaiFENesin   Syrup  (Sugar-Free) 200 milliGRAM(s) Oral every 6 hours PRN Cough      PHYSICAL EXAM:  Physical Exam - limited physical exam due to COVID - 19 isolation status  No Physical Exam at bedside completed in attempt to limit COVID-19   Reviewed H&P physical exam and most recent Hospitalist Physical Exam   See H&P physical exam and most recent Hospitalist Document physical exam for details    Vital Signs Last 24 Hrs  T(C): 36.8 (28 Apr 2020 08:51), Max: 37 (27 Apr 2020 23:19)  T(F): 98.3 (28 Apr 2020 08:51), Max: 98.6 (27 Apr 2020 23:19)  HR: 89 (28 Apr 2020 08:51) (84 - 96)  BP: 130/72 (28 Apr 2020 08:51) (95/59 - 132/80)  BP(mean): --  RR: 19 (28 Apr 2020 08:51) (18 - 19)  SpO2: 91% (28 Apr 2020 10:09) (91% - 94%)    LABS:                        9.6    6.21  )-----------( 295      ( 27 Apr 2020 06:25 )             32.0     04-28    141  |  100  |  24.0<H>  ----------------------------<  116<H>  4.7   |  29.0  |  0.99    Ca    10.4<H>      28 Apr 2020 07:42          I&O's Summary    27 Apr 2020 07:01  -  28 Apr 2020 07:00  --------------------------------------------------------  IN: 4225 mL / OUT: 2 mL / NET: 4223 mL    28 Apr 2020 07:01  -  28 Apr 2020 13:58  --------------------------------------------------------  IN: 350 mL / OUT: 0 mL / NET: 350 mL        RADIOLOGY & ADDITIONAL STUDIES:  CXR 04.23.20  IMPRESSION:  Diffuse right lung infiltrate. Mild left midlung discoid atelectasis.  NG tube in satisfactory position.  DISCRETE X-RAY DATA:  Percent of LEFT lung opacification: 1-33%  Percent of RIGHT lung opacification: 34-66%  Change in lung opacification from most recent x-ray (<=3 days): Increase  Change from prior dated 3 or more days (same admission): No Prior    CT head 04.23.20    IMPRESSION:       No acute intracranial findings.    ADVANCE DIRECTIVES:   Full Code

## 2020-04-28 NOTE — SWALLOW BEDSIDE ASSESSMENT ADULT - ASR SWALLOW ASPIRATION MONITOR
if observed, please discontinue PO & resume NPO status/change of breathing pattern/cough/oral hygiene/position upright (90Y)/fever/throat clearing/gurgly voice/pneumonia/upper respiratory infection

## 2020-04-28 NOTE — PROGRESS NOTE ADULT - ASSESSMENT
1. Hypernatremia (SNa+ 140--> 161)  2. ALVARO: likely ATN- resolved  3. COVID 19+     Hypernatremia in setting of poor PO intake  improving with hypotonic IVF  Continue free water supplementation  Monitor Scr, lytes and UOP  Nephrology signing off; please reconsult prn

## 2020-04-28 NOTE — CONSULT NOTE ADULT - PROBLEM SELECTOR RECOMMENDATION 9
Completed Plaquenil course  - Inflammatory markers downtrending, monitor   - Monitor for fever trends

## 2020-04-28 NOTE — PROGRESS NOTE ADULT - SUBJECTIVE AND OBJECTIVE BOX
[facility] DIVISION OF KIDNEY DISEASES AND HYPERTENSION -- FOLLOW UP NOTE  --------------------------------------------------------------------------------  Chief Complaint: hypernatremia    24 hour events/subjective:  mental status improved    PAST HISTORY  --------------------------------------------------------------------------------  No significant changes to PMH, PSH, FHx, SHx, unless otherwise noted    ALLERGIES & MEDICATIONS  --------------------------------------------------------------------------------  Allergies    Depakote (Hives)  lithium (Hives)    Intolerances      Standing Inpatient Medications  benztropine 0.5 milliGRAM(s) Oral two times a day  cyanocobalamin 1000 MICROGram(s) Oral daily  enoxaparin Injectable 40 milliGRAM(s) SubCutaneous daily  folic acid 1 milliGRAM(s) Oral daily  levothyroxine 75 MICROGram(s) Oral daily  LORazepam     Tablet 1 milliGRAM(s) Oral three times a day  mirtazapine 15 milliGRAM(s) Oral at bedtime  OLANZapine 5 milliGRAM(s) Oral at bedtime  OXcarbazepine 300 milliGRAM(s) Oral two times a day  polyethylene glycol 3350 17 Gram(s) Oral daily  senna 2 Tablet(s) Oral at bedtime  sodium chloride 0.225%. 1000 milliLiter(s) IV Continuous <Continuous>    PRN Inpatient Medications  acetaminophen  Suppository .. 650 milliGRAM(s) Rectal every 6 hours PRN  ALBUTerol    90 MICROgram(s) HFA Inhaler 2 Puff(s) Inhalation every 6 hours PRN  bisacodyl Suppository 10 milliGRAM(s) Rectal daily PRN  guaiFENesin   Syrup  (Sugar-Free) 200 milliGRAM(s) Oral every 6 hours PRN      REVIEW OF SYSTEMS  --------------------------------------------------------------------------------  limited    VITALS/PHYSICAL EXAM  --------------------------------------------------------------------------------  T(C): 36.8 (04-28-20 @ 08:51), Max: 37 (04-27-20 @ 23:19)  HR: 89 (04-28-20 @ 08:51) (84 - 96)  BP: 130/72 (04-28-20 @ 08:51) (95/59 - 132/80)  RR: 19 (04-28-20 @ 08:51) (18 - 19)  SpO2: 91% (04-28-20 @ 10:09) (91% - 94%)  Wt(kg): --        04-27-20 @ 07:01  -  04-28-20 @ 07:00  --------------------------------------------------------  IN: 4225 mL / OUT: 2 mL / NET: 4223 mL    04-28-20 @ 07:01  -  04-28-20 @ 11:09  --------------------------------------------------------  IN: 350 mL / OUT: 0 mL / NET: 350 mL      Physical Exam:  	Due to the nature of the pt's isolation status, no bedside physical exam done to limit spread of infection. Objective data was reviewed in detail.    LABS/STUDIES  --------------------------------------------------------------------------------              9.6    6.21  >-----------<  295      [04-27-20 @ 06:25]              32.0     141  |  100  |  24.0  ----------------------------<  116      [04-28-20 @ 07:42]  4.7   |  29.0  |  0.99        Ca     10.4     [04-28-20 @ 07:42]        Creatinine Trend:  SCr 0.99 [04-28 @ 07:42]  SCr 1.00 [04-27 @ 06:25]  SCr 1.01 [04-26 @ 10:49]  SCr 1.10 [04-25 @ 19:07]  SCr 1.33 [04-25 @ 07:40]    Urinalysis - [04-22-20 @ 00:59]      Color Yellow / Appearance Clear / SG 1.005 / pH 7.0      Gluc Negative / Ketone Negative  / Bili Negative / Urobili Negative       Blood Small / Protein 15 / Leuk Est Negative / Nitrite Negative      RBC 0-2 / WBC Negative / Hyaline  / Gran  / Sq Epi  / Non Sq Epi Occasional / Bacteria     Urine Sodium <30      [04-24-20 @ 13:41]  Urine Osmolality 262      [04-24-20 @ 13:41]    Ferritin 362      [04-25-20 @ 07:40]  PTH -- (Ca 9.7)      [04-25-20 @ 19:09]   73  TSH 0.21      [04-23-20 @ 14:28]    HCV 0.10, Nonreact      [04-21-20 @ 16:36]

## 2020-04-28 NOTE — SWALLOW BEDSIDE ASSESSMENT ADULT - COMMENTS
As per MD note:   "64 yo F w/ hx bipolar disorder, intermittently nonverbal presents to ER for persistent fevers and new hypoxia (80's on room air) since being diagnosed with COVID week prior as outpatient. In the ED patient as noted to be hypoxic at 85% RA and improved with 100% o2 with 4 L NC."

## 2020-04-28 NOTE — CONSULT NOTE ADULT - PROBLEM SELECTOR RECOMMENDATION 2
Patient previously on lithium for bipolar as per daughter but it was stopped after 30 years due to kidney damage  Psych consulted for input continue with remeron, trileptal, ativan, zyprexa

## 2020-04-28 NOTE — SWALLOW BEDSIDE ASSESSMENT ADULT - SWALLOW EVAL: DIAGNOSIS
Oral dysphagia, impacted by cognition. Pharyngeal dysphagia suspected for nectar thick fluids: +multiple swallows.  Pharyngeal stage of swallow clinically unremarkable for puree & honey thick fluids with no overt s/s aspiration

## 2020-04-28 NOTE — PROGRESS NOTE ADULT - SUBJECTIVE AND OBJECTIVE BOX
CC: hypoxia (24 Apr 2020 12:03)    INTERVAL HPI/OVERNIGHT EVENTS: No overnight events    Pt seen and examined at bedside  Pt laying in bed, more awake and alert today. Tells me her name, her daughters name and states she would like to try to eat  GOC discussion had with palliative yesterday afternoon, as per conversation daughter would like to defer peg and rehab at this time. Would like to have pt return home with 24/7 aides.    Vital Signs Last 24 Hrs  T(C): 37 (27 Apr 2020 23:19), Max: 37 (27 Apr 2020 23:19)  T(F): 98.6 (27 Apr 2020 23:19), Max: 98.6 (27 Apr 2020 23:19)  HR: 96 (27 Apr 2020 23:19) (76 - 96)  BP: 95/59 (27 Apr 2020 23:19) (95/59 - 132/80)  BP(mean): --  RR: 18 (27 Apr 2020 23:19) (18 - 20)  SpO2: 92% (27 Apr 2020 23:19) (92% - 94%) on 3-4L NC however NC barely in nares    PHYSICAL EXAM  General: In NAD, laying in bed, still with tremor  ENMT: NGT & NC in place  Gastrointestinal: Soft non-tender non-distended; Normal bowel sounds  Extremities: Normal range of motion, No clubbing, cyanosis or edema  Vascular: Peripheral pulses palpable 2+ bilaterally  Neurological: Oriented to name, does not further interact with exam.    LABS/IMAGING: Reviewed

## 2020-04-28 NOTE — SWALLOW BEDSIDE ASSESSMENT ADULT - ORAL PHASE
Decreased anterior-posterior movement of the bolus/Delayed oral transit time/+stasis buccal area with initial trial only: pt beneits from tactile input to facilitate movement of bolus Delayed oral transit time

## 2020-04-28 NOTE — CONSULT NOTE ADULT - PROBLEM SELECTOR RECOMMENDATION 4
In progress Discussed care with DERECK Marx caring for patient - reviewed hospitalist physical exam as well  Patient resting in bed, appears comfortable  Speech and swallow evaluated patient today  Patient ate 75% of her lunch as per documentation  Arcelia(Daughter) expressing she wants to take her mother home as she does even better at home with her appetite on a normal basis  Patient has 24 hour aides through medicaid that helps with ADLs   Arcelia expresses that her mother is FULL CODE including cpr and intubation in the event of cardiopulmonary arrest  Arcelia is hopeful that her mother is only going to improve from here since she feels a lot of her set back is due to being in an unfamiliar environment  Emotional support provided  Will continue to support and monitor    Total time spent 30 minutes    Thank you for the opportunity to assist with the care of this patient.   [place] [facility] [phone].

## 2020-04-29 LAB
ANION GAP SERPL CALC-SCNC: 14 MMOL/L — SIGNIFICANT CHANGE UP (ref 5–17)
BUN SERPL-MCNC: 32 MG/DL — HIGH (ref 8–20)
CALCIUM SERPL-MCNC: 10.8 MG/DL — HIGH (ref 8.6–10.2)
CHLORIDE SERPL-SCNC: 108 MMOL/L — HIGH (ref 98–107)
CO2 SERPL-SCNC: 30 MMOL/L — HIGH (ref 22–29)
CREAT SERPL-MCNC: 1.26 MG/DL — SIGNIFICANT CHANGE UP (ref 0.5–1.3)
GLUCOSE SERPL-MCNC: 113 MG/DL — HIGH (ref 70–99)
POTASSIUM SERPL-MCNC: 5.4 MMOL/L — HIGH (ref 3.5–5.3)
POTASSIUM SERPL-SCNC: 5.4 MMOL/L — HIGH (ref 3.5–5.3)
SODIUM SERPL-SCNC: 152 MMOL/L — HIGH (ref 135–145)

## 2020-04-29 PROCEDURE — 99232 SBSQ HOSP IP/OBS MODERATE 35: CPT | Mod: CS

## 2020-04-29 RX ORDER — SENNA PLUS 8.6 MG/1
2 TABLET ORAL
Qty: 0 | Refills: 0 | DISCHARGE
Start: 2020-04-29

## 2020-04-29 RX ORDER — FOLIC ACID 0.8 MG
1 TABLET ORAL
Qty: 30 | Refills: 0
Start: 2020-04-29 | End: 2020-05-28

## 2020-04-29 RX ORDER — PREGABALIN 225 MG/1
1 CAPSULE ORAL
Qty: 30 | Refills: 0
Start: 2020-04-29 | End: 2020-05-28

## 2020-04-29 RX ORDER — BENZTROPINE MESYLATE 1 MG
1 TABLET ORAL
Qty: 60 | Refills: 0
Start: 2020-04-29 | End: 2020-05-28

## 2020-04-29 RX ORDER — ALBUTEROL 90 UG/1
2 AEROSOL, METERED ORAL
Qty: 1 | Refills: 0
Start: 2020-04-29

## 2020-04-29 RX ORDER — POLYETHYLENE GLYCOL 3350 17 G/17G
17 POWDER, FOR SOLUTION ORAL
Qty: 0 | Refills: 0 | DISCHARGE
Start: 2020-04-29

## 2020-04-29 RX ORDER — OLANZAPINE 15 MG/1
1 TABLET, FILM COATED ORAL
Qty: 0 | Refills: 0 | DISCHARGE

## 2020-04-29 RX ORDER — OLANZAPINE 15 MG/1
1 TABLET, FILM COATED ORAL
Qty: 0 | Refills: 0 | DISCHARGE
Start: 2020-04-29

## 2020-04-29 RX ADMIN — OXCARBAZEPINE 300 MILLIGRAM(S): 300 TABLET, FILM COATED ORAL at 05:56

## 2020-04-29 RX ADMIN — Medication 1 MILLIGRAM(S): at 12:18

## 2020-04-29 RX ADMIN — Medication 1 MILLIGRAM(S): at 05:56

## 2020-04-29 RX ADMIN — SENNA PLUS 2 TABLET(S): 8.6 TABLET ORAL at 21:45

## 2020-04-29 RX ADMIN — Medication 1 MILLIGRAM(S): at 11:25

## 2020-04-29 RX ADMIN — Medication 1 MILLIGRAM(S): at 21:44

## 2020-04-29 RX ADMIN — OLANZAPINE 5 MILLIGRAM(S): 15 TABLET, FILM COATED ORAL at 21:45

## 2020-04-29 RX ADMIN — OXCARBAZEPINE 300 MILLIGRAM(S): 300 TABLET, FILM COATED ORAL at 17:06

## 2020-04-29 RX ADMIN — POLYETHYLENE GLYCOL 3350 17 GRAM(S): 17 POWDER, FOR SOLUTION ORAL at 11:25

## 2020-04-29 RX ADMIN — Medication 0.5 MILLIGRAM(S): at 05:55

## 2020-04-29 RX ADMIN — MIRTAZAPINE 15 MILLIGRAM(S): 45 TABLET, ORALLY DISINTEGRATING ORAL at 21:45

## 2020-04-29 RX ADMIN — Medication 75 MICROGRAM(S): at 05:56

## 2020-04-29 RX ADMIN — ENOXAPARIN SODIUM 40 MILLIGRAM(S): 100 INJECTION SUBCUTANEOUS at 11:26

## 2020-04-29 RX ADMIN — PREGABALIN 1000 MICROGRAM(S): 225 CAPSULE ORAL at 11:25

## 2020-04-29 RX ADMIN — Medication 0.5 MILLIGRAM(S): at 17:06

## 2020-04-29 NOTE — DISCHARGE NOTE PROVIDER - NSDCMRMEDTOKEN_GEN_ALL_CORE_FT
albuterol 90 mcg/inh inhalation aerosol: 2 puff(s) inhaled every 6 hours, As needed, Shortness of Breath and/or Wheezing  benztropine 0.5 mg oral tablet: 1 tab(s) orally 2 times a day  Colace 100 mg oral capsule: 1 cap(s) orally 2 times a day  cyanocobalamin 1000 mcg oral tablet: 1 tab(s) orally once a day  folic acid 1 mg oral tablet: 1 tab(s) orally once a day  LORazepam 0.5 mg oral tablet: 1 tab(s) orally 2 times a day  OLANZapine 5 mg oral tablet: 1 tab(s) orally once a day (at bedtime)  OXcarbazepine 300 mg oral tablet: 1 tab(s) orally 2 times a day  polyethylene glycol 3350 oral powder for reconstitution: 17 gram(s) orally once a day  Remeron 15 mg oral tablet: 1 tab(s) orally once a day (at bedtime)  senna oral tablet: 2 tab(s) orally once a day (at bedtime)  Sensipar 30 mg oral tablet: 1 tab(s) orally 2 times a day  sertraline 50 mg oral tablet: 1 tab(s) orally once a day  Synthroid 75 mcg (0.075 mg) oral tablet: 1 tab(s) orally once a day albuterol 90 mcg/inh inhalation aerosol: 2 puff(s) inhaled every 6 hours, As needed, Shortness of Breath and/or Wheezing  ascorbic acid 500 mg oral tablet: 1 tab(s) orally once a day  benztropine 0.5 mg oral tablet: 1 tab(s) orally 2 times a day  Colace 100 mg oral capsule: 1 cap(s) orally 2 times a day  cyanocobalamin 1000 mcg oral tablet: 1 tab(s) orally once a day  folic acid 1 mg oral tablet: 1 tab(s) orally once a day  LORazepam 0.5 mg oral tablet: 1 tab(s) orally 2 times a day  Multiple Vitamins with Minerals oral tablet: 1 tab(s) orally once a day  OLANZapine 5 mg oral tablet: 1 tab(s) orally once a day (at bedtime)  OXcarbazepine 300 mg oral tablet: 1 tab(s) orally 2 times a day  polyethylene glycol 3350 oral powder for reconstitution: 17 gram(s) orally once a day  Remeron 15 mg oral tablet: 1 tab(s) orally once a day (at bedtime)  senna oral tablet: 2 tab(s) orally once a day (at bedtime)  Sensipar 30 mg oral tablet: 1 tab(s) orally 2 times a day  sertraline 50 mg oral tablet: 1 tab(s) orally once a day  Synthroid 75 mcg (0.075 mg) oral tablet: 1 tab(s) orally once a day  thiamine 100 mg oral tablet: 1 tab(s) orally once a day

## 2020-04-29 NOTE — PROGRESS NOTE ADULT - SUBJECTIVE AND OBJECTIVE BOX
CC: hypoxia (2020 10:18)    HPI:  64 yo F w/ hx bipolar disorder, intermittently nonverbal presents to ER for persistent fevers and new hypoxia (80's on room air) since being diagnosed with COVID week prior as outpatient.  In Er , hypoxic at 85% RA and improved with 100% o2 (2020 18:00)    INTERVAL HPI/OVERNIGHT EVENTS: Patient seen and examined lying in bed.  Patient minimally verbal, denies any complaints.      Vital Signs Last 24 Hrs  T(C): 36.9 (2020 08:39), Max: 37.1 (2020 23:32)  T(F): 98.4 (2020 08:39), Max: 98.8 (2020 23:32)  HR: 85 (2020 08:39) (85 - 89)  BP: 110/74 (2020 08:39) (110/70 - 110/74)  BP(mean): --  RR: 19 (2020 08:39) (18 - 19)  SpO2: 96% (2020 23:32) (96% - 96%)  I&O's Detail    2020 07:01  -  2020 07:00  --------------------------------------------------------  IN:    dextrose 5%.: 250 mL    ns in tub fed  [id number]: 100 mL    Oral Fluid: 320 mL  Total IN: 670 mL    OUT:  Total OUT: 0 mL    Total NET: 670 mL      PHYSICAL EXAM:  GENERAL: NAD  HEAD:  Atraumatic, Normocephalic  NECK: Supple, No JVD, Normal thyroid  NERVOUS SYSTEM:  Alert, minimally verbal, generalized weakness  CHEST/LUNG: Clear to auscultation bilaterally  HEART: Regular rate and rhythm; No murmurs, rubs, or gallops  ABDOMEN: Soft, Nontender, Nondistended; Bowel sounds present  EXTREMITIES:  2+ Peripheral Pulses, No clubbing, cyanosis, or edema            2020 07:48    152    |  108    |  32.0   ----------------------------<  113    5.4     |  30.0   |  1.26     Ca    10.8       2020 07:48      Urinalysis Basic - ( 2020 14:30 )    Color: Yellow / Appearance: Clear / S.005 / pH: x  Gluc: x / Ketone: Negative  / Bili: Negative / Urobili: Negative mg/dL   Blood: x / Protein: Negative mg/dL / Nitrite: Negative   Leuk Esterase: Small / RBC: 0-2 /HPF / WBC 0-2   Sq Epi: x / Non Sq Epi: Occasional / Bacteria: Occasional        MEDICATIONS  (STANDING):  benztropine 0.5 milliGRAM(s) Oral two times a day  cyanocobalamin 1000 MICROGram(s) Oral daily  enoxaparin Injectable 40 milliGRAM(s) SubCutaneous daily  folic acid 1 milliGRAM(s) Oral daily  levothyroxine 75 MICROGram(s) Oral daily  LORazepam     Tablet 1 milliGRAM(s) Oral three times a day  mirtazapine 15 milliGRAM(s) Oral at bedtime  OLANZapine 5 milliGRAM(s) Oral at bedtime  OXcarbazepine 300 milliGRAM(s) Oral two times a day  polyethylene glycol 3350 17 Gram(s) Oral daily  senna 2 Tablet(s) Oral at bedtime    MEDICATIONS  (PRN):  acetaminophen  Suppository .. 650 milliGRAM(s) Rectal every 6 hours PRN Temp greater or equal to 38C (100.4F), Mild Pain (1 - 3), Moderate Pain (4 - 6)  ALBUTerol    90 MICROgram(s) HFA Inhaler 2 Puff(s) Inhalation every 6 hours PRN Shortness of Breath and/or Wheezing  bisacodyl Suppository 10 milliGRAM(s) Rectal daily PRN Constipation  guaiFENesin   Syrup  (Sugar-Free) 200 milliGRAM(s) Oral every 6 hours PRN Cough

## 2020-04-29 NOTE — DISCHARGE NOTE PROVIDER - NSDCFUADDINST_GEN_ALL_CORE_FT
It has been determined that you no longer need hospitalization and can recover while remaining in self-quarantine at home for 14 days. You should follow the prevention steps below until a healthcare provider or [facility] says you can return to your normal activities.  Please remain in quarantine until then. You should restrict activities outside your home except for getting medical care.  Cover your coughs and sneezes.  Clean your hands often. Monitor your symptoms, if you have a medical emergency call 911.

## 2020-04-29 NOTE — DISCHARGE NOTE PROVIDER - HOSPITAL COURSE
64 yo F w/ hx bipolar disorder, intermittently nonverbal presents to ER for persistent fevers and new hypoxia (80's on room air) since being diagnosed with COVID week prior as outpatient. In the ED patient as noted to be hypoxic at 85% RA and improved with 100% o2 with 4 L NC. Patient had worsening mental status.  CT Head negative. EEG with epileptiform activity but no clinical seizure.  Neurology and psych followed.  Medications adjusted.  Patient had NGT for tube feeds.  Speech and swallow following and diet advanced to puree with  honey thick liquids.  Patient completed Plaquenil course.  Inflammatory markers improved.   Patient has ALVARO on CKD with hypernatremia that improved with DDAVP and IVF.  Patient's O2 weaned off.  Patient evaluated by PT and recommended Home with assistance vs YA.  Family requesting patient come home with 24/7 aids.  Patient stable for discharge to home with 24/7 aids.          Vital Signs Last 24 Hrs    T(C): 36.9 (29 Apr 2020 08:39), Max: 37.1 (28 Apr 2020 23:32)    T(F): 98.4 (29 Apr 2020 08:39), Max: 98.8 (28 Apr 2020 23:32)    HR: 85 (29 Apr 2020 08:39) (85 - 89)    BP: 110/74 (29 Apr 2020 08:39) (110/70 - 111/72)    BP(mean): --    RR: 19 (29 Apr 2020 08:39) (18 - 19)    SpO2: 96% (28 Apr 2020 23:32) (89% - 96%)        PHYSICAL EXAM:    GENERAL: NAD    HEAD:  Atraumatic, Normocephalic    NECK: Supple, No JVD, Normal thyroid    NERVOUS SYSTEM:  Alert & Oriented X2, disoriented to time, generalized weakness    CHEST/LUNG: Clear to auscultation bilaterally    HEART: Regular rate and rhythm; No murmurs, rubs, or gallops    ABDOMEN: Soft, Nontender, Nondistended; Bowel sounds present    EXTREMITIES:  2+ Peripheral Pulses, No clubbing, cyanosis, or edema 66 yo F w/ hx bipolar disorder, intermittently nonverbal presents to ER for persistent fevers and new hypoxia (80's on room air) since being diagnosed with COVID week prior as outpatient. In the ED patient as noted to be hypoxic at 85% RA and improved with 100% o2 with 4 L NC. Patient had worsening mental status.  CT Head negative. EEG with epileptiform activity but no clinical seizure.  Neurology and psych followed.  Medications adjusted.  Patient had NGT for tube feeds.  Speech and swallow following and diet advanced to puree with  honey thick liquids.  Patient completed Plaquenil course.  Inflammatory markers improved.   Patient has ALVARO on CKD with hypernatremia that improved with DDAVP and IVF.  Patient evaluated by PT and recommended Home with assistance vs YA.  Family requesting patient come home with 24/7 aids.  Patient stable for discharge to home with 24/7 aids and home O2 arranged.          Vital Signs Last 24 Hrs    T(C): 36.8 (04 May 2020 10:00), Max: 36.8 (04 May 2020 10:00)    T(F): 98.3 (04 May 2020 10:00), Max: 98.3 (04 May 2020 10:00)    HR: 80 (04 May 2020 10:00) (75 - 80)    BP: 115/76 (04 May 2020 10:00) (101/60 - 115/76)    BP(mean): --    RR: 17 (04 May 2020 10:00) (14 - 18)    SpO2: 91% (04 May 2020 10:00) (91% - 95%)        PHYSICAL EXAM:    GENERAL: NAD    HEAD:  Atraumatic, Normocephalic    NECK: Supple, No JVD, Normal thyroid    NERVOUS SYSTEM:  Alert & Oriented X2, disoriented to time, generalized weakness    CHEST/LUNG: Clear to auscultation bilaterally    HEART: Regular rate and rhythm; No murmurs, rubs, or gallops    ABDOMEN: Soft, Nontender, Nondistended; Bowel sounds present    EXTREMITIES:  2+ Peripheral Pulses, No clubbing, cyanosis, or edema 66 yo F w/ hx bipolar disorder, intermittently nonverbal presents to ER for persistent fevers and new hypoxia (80's on room air) since being diagnosed with COVID week prior as outpatient. In the ED patient as noted to be hypoxic at 85% RA and improved with 100% o2 with 4 L NC. Patient had worsening mental status.  CT Head negative. EEG with epileptiform activity but no clinical seizure.  Neurology and psych followed.  Medications adjusted.  Patient had NGT for tube feeds.  Speech and swallow following and diet advanced to puree with  honey thick liquids.  Patient completed Plaquenil course.  Inflammatory markers improved.   Patient has ALVARO on CKD with hypernatremia that improved with DDAVP and IVF.  Patient evaluated by PT and recommended Home with assistance vs YA.  Family requesting patient come home with 24/7 aids.  Patient stable for discharge to home with 24/7 aids and home O2 arranged.          Vital Signs Last 24 Hrs    T(C): 37 (05 May 2020 10:10), Max: 37 (05 May 2020 10:10)    T(F): 98.6 (05 May 2020 10:10), Max: 98.6 (05 May 2020 10:10)    HR: 74 (05 May 2020 08:15) (74 - 86)    BP: 120/74 (05 May 2020 08:15) (105/62 - 120/74)    BP(mean): --    RR: 19 (05 May 2020 08:15) (18 - 19)    SpO2: 92% (05 May 2020 08:15) (85% - 94%)        PHYSICAL EXAM:    GENERAL: NAD    HEAD:  Atraumatic, Normocephalic    NECK: Supple, No JVD, Normal thyroid    NERVOUS SYSTEM:  Alert & Oriented X2, disoriented to time, generalized weakness    CHEST/LUNG: Clear to auscultation bilaterally    HEART: Regular rate and rhythm; No murmurs, rubs, or gallops    ABDOMEN: Soft, Nontender, Nondistended; Bowel sounds present    EXTREMITIES:  2+ Peripheral Pulses, No clubbing, cyanosis, or edema

## 2020-04-29 NOTE — PROGRESS NOTE ADULT - ASSESSMENT
64 yo F w/ hx bipolar disorder, intermittently nonverbal presents to ER for persistent fevers and new hypoxia (80's on room air) since being diagnosed with COVID week prior as outpatient. In the ED patient as noted to be hypoxic at 85% RA and improved with 100% o2 with 4 L NC.     1. Acute metabolic/toxic encephalopathy  - Multifactorial, hypernatremia, metabolic encephalopathy, possible catatonia  - Improving, today more awake/alert/conversing   - CT head neg  - EEG with epileptiform activity but no clinical seizure. Continue trileptal   - Neuro following, recs appreciated  - Psych consult appreciated, c/w Remeron ativan (increased to TID), zyprexa, trileptal. Zyprexa reduced to 5mg PO QD  - On cogentin  - Speech and swallow follow up advanced diet to puree with nectar thick liquids     2. Acute hypoxic resp failure due to COVID19  - Improving  - Completed Plaquenil course  - Inflammatory markers downtrending, monitor     3. ALVARO vs CKD with hypernatremia  - Likely CKD per daughter history, lithium induced (no longer on Lithium)  - ALVARO resolved, Cr normalized   - Sodium 152 today  - S/p DDVAP and D5 IVF  - Nephrology following    4. Bipolar disorder  - Continue home meds; for years was on lithium which controlled symptoms but was stopped due to kidney damage; she has struggled to treat bipolar since then  - She is no longer on lithium  - Psych consult appreciated, continue Remeron, Trileptal, Ativan and Zyprexa. Zyprexa reduced to 5mg PO QD given decreased sensorium     5. Hypothyroid  - C/w synthroid    6. Dysphagia   - As per GOC discussion with family/palliative, family does not want peg  - S&S following - advanced to puree with nectar thick liquids    7. Malnourished  - Plan as above    #DVT ppx - lovenox daily    DISPO: Hopeful to discharge home with O2 to resume 24 hour home aids. Need to wean supplemental O2. Seen by PT, home w/ assist

## 2020-04-29 NOTE — DISCHARGE NOTE PROVIDER - NSDCCPCAREPLAN_GEN_ALL_CORE_FT
PRINCIPAL DISCHARGE DIAGNOSIS  Diagnosis: COVID-19 virus infection  Assessment and Plan of Treatment: Completed treatment      SECONDARY DISCHARGE DIAGNOSES  Diagnosis: CKD (chronic kidney disease)  Assessment and Plan of Treatment: Continue current medications as prescribed.    Diagnosis: Bipolar 1 disorder  Assessment and Plan of Treatment: Continue current medications as prescribed.  Follow up with psych.

## 2020-04-30 LAB
ANION GAP SERPL CALC-SCNC: 14 MMOL/L — SIGNIFICANT CHANGE UP (ref 5–17)
BUN SERPL-MCNC: 33 MG/DL — HIGH (ref 8–20)
CALCIUM SERPL-MCNC: 11.2 MG/DL — HIGH (ref 8.6–10.2)
CHLORIDE SERPL-SCNC: 109 MMOL/L — HIGH (ref 98–107)
CO2 SERPL-SCNC: 30 MMOL/L — HIGH (ref 22–29)
CREAT SERPL-MCNC: 1.36 MG/DL — HIGH (ref 0.5–1.3)
GLUCOSE SERPL-MCNC: 98 MG/DL — SIGNIFICANT CHANGE UP (ref 70–99)
POTASSIUM SERPL-MCNC: 5 MMOL/L — SIGNIFICANT CHANGE UP (ref 3.5–5.3)
POTASSIUM SERPL-SCNC: 5 MMOL/L — SIGNIFICANT CHANGE UP (ref 3.5–5.3)
SODIUM SERPL-SCNC: 153 MMOL/L — HIGH (ref 135–145)

## 2020-04-30 PROCEDURE — 99232 SBSQ HOSP IP/OBS MODERATE 35: CPT | Mod: CS

## 2020-04-30 RX ORDER — ASCORBIC ACID 60 MG
500 TABLET,CHEWABLE ORAL DAILY
Refills: 0 | Status: DISCONTINUED | OUTPATIENT
Start: 2020-04-30 | End: 2020-05-05

## 2020-04-30 RX ORDER — SODIUM CHLORIDE 9 MG/ML
1000 INJECTION, SOLUTION INTRAVENOUS
Refills: 0 | Status: COMPLETED | OUTPATIENT
Start: 2020-04-30 | End: 2020-04-30

## 2020-04-30 RX ORDER — MULTIVIT-MIN/FERROUS GLUCONATE 9 MG/15 ML
1 LIQUID (ML) ORAL DAILY
Refills: 0 | Status: DISCONTINUED | OUTPATIENT
Start: 2020-04-30 | End: 2020-05-05

## 2020-04-30 RX ORDER — THIAMINE MONONITRATE (VIT B1) 100 MG
100 TABLET ORAL DAILY
Refills: 0 | Status: DISCONTINUED | OUTPATIENT
Start: 2020-04-30 | End: 2020-05-05

## 2020-04-30 RX ADMIN — Medication 500 MILLIGRAM(S): at 13:42

## 2020-04-30 RX ADMIN — Medication 1 TABLET(S): at 13:42

## 2020-04-30 RX ADMIN — Medication 1 MILLIGRAM(S): at 05:25

## 2020-04-30 RX ADMIN — SODIUM CHLORIDE 75 MILLILITER(S): 9 INJECTION, SOLUTION INTRAVENOUS at 14:24

## 2020-04-30 RX ADMIN — ENOXAPARIN SODIUM 40 MILLIGRAM(S): 100 INJECTION SUBCUTANEOUS at 13:12

## 2020-04-30 RX ADMIN — Medication 1 MILLIGRAM(S): at 13:13

## 2020-04-30 RX ADMIN — Medication 100 MILLIGRAM(S): at 13:42

## 2020-04-30 RX ADMIN — MIRTAZAPINE 15 MILLIGRAM(S): 45 TABLET, ORALLY DISINTEGRATING ORAL at 21:30

## 2020-04-30 RX ADMIN — OXCARBAZEPINE 300 MILLIGRAM(S): 300 TABLET, FILM COATED ORAL at 05:26

## 2020-04-30 RX ADMIN — SENNA PLUS 2 TABLET(S): 8.6 TABLET ORAL at 21:30

## 2020-04-30 RX ADMIN — OLANZAPINE 5 MILLIGRAM(S): 15 TABLET, FILM COATED ORAL at 21:30

## 2020-04-30 RX ADMIN — Medication 75 MICROGRAM(S): at 05:25

## 2020-04-30 RX ADMIN — PREGABALIN 1000 MICROGRAM(S): 225 CAPSULE ORAL at 13:12

## 2020-04-30 RX ADMIN — POLYETHYLENE GLYCOL 3350 17 GRAM(S): 17 POWDER, FOR SOLUTION ORAL at 13:13

## 2020-04-30 RX ADMIN — Medication 1 MILLIGRAM(S): at 21:29

## 2020-04-30 RX ADMIN — Medication 0.5 MILLIGRAM(S): at 05:25

## 2020-04-30 RX ADMIN — OXCARBAZEPINE 300 MILLIGRAM(S): 300 TABLET, FILM COATED ORAL at 18:23

## 2020-04-30 RX ADMIN — Medication 0.5 MILLIGRAM(S): at 18:23

## 2020-04-30 NOTE — PROGRESS NOTE ADULT - SUBJECTIVE AND OBJECTIVE BOX
CC: hypoxia (2020 15:50)    HPI:  66 yo F w/ hx bipolar disorder, intermittently nonverbal presents to ER for persistent fevers and new hypoxia (80's on room air) since being diagnosed with COVID week prior as outpatient.    In ER, hypoxic at 85% RA and improved with 100% o2 (2020 18:00)    INTERVAL HPI/OVERNIGHT EVENTS: Patient seen and examined lying in bed.  Patient requesting water.  Patient denies any complaints.      Vital Signs Last 24 Hrs  T(C): 36.8 (2020 07:56), Max: 36.8 (2020 15:34)  T(F): 98.2 (2020 07:56), Max: 98.3 (2020 15:34)  HR: 78 (2020 07:56) (78 - 84)  BP: 120/80 (2020 07:56) (102/61 - 122/73)  BP(mean): --  RR: 18 (2020 07:56) (18 - 18)  SpO2: 92% (2020 07:56) (89% - 100%)  I&O's Detail    PHYSICAL EXAM:  GENERAL: NAD  HEAD:  Atraumatic, Normocephalic  NECK: Supple, No JVD, Normal thyroid  NERVOUS SYSTEM:  Alert, generalized weakness, (+) bilateral UE tremor  CHEST/LUNG: Clear to auscultation bilaterally  HEART: Regular rate and rhythm; No murmurs, rubs, or gallops  ABDOMEN: Soft, Nontender, Nondistended; Bowel sounds present  EXTREMITIES:  2+ Peripheral Pulses, No clubbing, cyanosis, or edema          2020 08:23    153    |  109    |  33.0   ----------------------------<  98     5.0     |  30.0   |  1.36     Ca    11.2       2020 08:23        Urinalysis Basic - ( 2020 14:30 )    Color: Yellow / Appearance: Clear / S.005 / pH: x  Gluc: x / Ketone: Negative  / Bili: Negative / Urobili: Negative mg/dL   Blood: x / Protein: Negative mg/dL / Nitrite: Negative   Leuk Esterase: Small / RBC: 0-2 /HPF / WBC 0-2   Sq Epi: x / Non Sq Epi: Occasional / Bacteria: Occasional        MEDICATIONS  (STANDING):  ascorbic acid 500 milliGRAM(s) Oral daily  benztropine 0.5 milliGRAM(s) Oral two times a day  cyanocobalamin 1000 MICROGram(s) Oral daily  dextrose 5%. 1000 milliLiter(s) (75 mL/Hr) IV Continuous <Continuous>  enoxaparin Injectable 40 milliGRAM(s) SubCutaneous daily  folic acid 1 milliGRAM(s) Oral daily  levothyroxine 75 MICROGram(s) Oral daily  LORazepam     Tablet 1 milliGRAM(s) Oral three times a day  mirtazapine 15 milliGRAM(s) Oral at bedtime  multivitamin/minerals 1 Tablet(s) Oral daily  OLANZapine 5 milliGRAM(s) Oral at bedtime  OXcarbazepine 300 milliGRAM(s) Oral two times a day  polyethylene glycol 3350 17 Gram(s) Oral daily  senna 2 Tablet(s) Oral at bedtime  thiamine 100 milliGRAM(s) Oral daily    MEDICATIONS  (PRN):  acetaminophen  Suppository .. 650 milliGRAM(s) Rectal every 6 hours PRN Temp greater or equal to 38C (100.4F), Mild Pain (1 - 3), Moderate Pain (4 - 6)  ALBUTerol    90 MICROgram(s) HFA Inhaler 2 Puff(s) Inhalation every 6 hours PRN Shortness of Breath and/or Wheezing  bisacodyl Suppository 10 milliGRAM(s) Rectal daily PRN Constipation  guaiFENesin   Syrup  (Sugar-Free) 200 milliGRAM(s) Oral every 6 hours PRN Cough

## 2020-04-30 NOTE — CHART NOTE - NSCHARTNOTEFT_GEN_A_CORE
Source: Patient [ ]  Family [ ]   other [ x]    Current Diet: Diet, Dysphagia 1 Pureed-Nectar Consistency Fluid (04-29-20 @ 15:22)      Patient reports [ ] nausea  [ ] vomiting [ ] diarrhea [ ] constipation  [ ]chewing problems [ ] swallowing issues  [ ] other:     PO intake:  < 50% [ ]   50-75%  [ ]   %  [x ]  other :    Source for PO intake [ ] Patient [ ] family [ x] chart [ ] staff [ ] other    Current Weight:   (4/20) 125 lbs  No new weight  No edema documented     Pertinent Medications: MEDICATIONS  (STANDING):  benztropine 0.5 milliGRAM(s) Oral two times a day  cyanocobalamin 1000 MICROGram(s) Oral daily  enoxaparin Injectable 40 milliGRAM(s) SubCutaneous daily  folic acid 1 milliGRAM(s) Oral daily  levothyroxine 75 MICROGram(s) Oral daily  LORazepam     Tablet 1 milliGRAM(s) Oral three times a day  mirtazapine 15 milliGRAM(s) Oral at bedtime  OLANZapine 5 milliGRAM(s) Oral at bedtime  OXcarbazepine 300 milliGRAM(s) Oral two times a day  polyethylene glycol 3350 17 Gram(s) Oral daily  senna 2 Tablet(s) Oral at bedtime    MEDICATIONS  (PRN):  acetaminophen  Suppository .. 650 milliGRAM(s) Rectal every 6 hours PRN Temp greater or equal to 38C (100.4F), Mild Pain (1 - 3), Moderate Pain (4 - 6)  ALBUTerol    90 MICROgram(s) HFA Inhaler 2 Puff(s) Inhalation every 6 hours PRN Shortness of Breath and/or Wheezing  bisacodyl Suppository 10 milliGRAM(s) Rectal daily PRN Constipation  guaiFENesin   Syrup  (Sugar-Free) 200 milliGRAM(s) Oral every 6 hours PRN Cough    Pertinent Labs: N/A      Skin: Intact per documentation     Nutrition focused physical exam not conducted at this time- found signs of malnutrition [ ]absent [ ]present    Subcutaneous fat loss: [ ] Orbital fat pads region, [ ]Buccal fat region, [ ]Triceps region,  [ ]Ribs region    Muscle wasting: [ ]Temples region, [ ]Clavicle region, [ ]Shoulder region, [ ]Scapula region, [ ]Interosseous region,  [ ]thigh region, [ ]Calf region    Estimated Needs:   [x ] no change since previous assessment  [ ] recalculated:     Current Nutrition Diagnosis: Pt remains at nutrition risk secondary to increased nutrient needs related to increased physiological demand to maintain nutrition status as evidenced by acute respiratory failure secondary to COVID and acute metabolic/toxic encephalopathy with poor po intake, now with dysphagia requiring a pureed diet with nectar thick liquids per SLP. Tube feeds now discontinued. Per documentation, family does not want PEG- seen by SLP yesterday 4/29 with recommendations for a pureed diet and nectar thick liquids. Pt needs total assistance at meals, has been with good po intake at this time. Last BM 4/27 per documentation.     Recommendations:   1) Continue diet as tolerated/per SLP recommendations.  2) Continue vit B12 supplementation, add MVI, thiamine, and vit C.  3) Continue bowel regimen PRN.  4) Encourage po intake, monitor diet tolerance, and provide assistance at all meals.  5) Obtain daily weights to monitor trends.     Monitoring and Evaluation:   [ x] PO intake [ x] Tolerance to diet prescription [X] Weights  [X] Follow up per protocol [X] Labs. Source: Patient [ ]  Family [ ]   other [ x]    Current Diet: Diet, Dysphagia 1 Pureed-Nectar Consistency Fluid (04-29-20 @ 15:22)      Patient reports [ ] nausea  [ ] vomiting [ ] diarrhea [ ] constipation  [ ]chewing problems [ ] swallowing issues  [ ] other:     PO intake:  < 50% [ ]   50-75%  [ ]   %  [x ]  other :    Source for PO intake [ ] Patient [ ] family [ x] chart [ ] staff [ ] other    Current Weight:   (4/20) 125 lbs  No new weight  No edema documented     Pertinent Medications: MEDICATIONS  (STANDING):  benztropine 0.5 milliGRAM(s) Oral two times a day  cyanocobalamin 1000 MICROGram(s) Oral daily  enoxaparin Injectable 40 milliGRAM(s) SubCutaneous daily  folic acid 1 milliGRAM(s) Oral daily  levothyroxine 75 MICROGram(s) Oral daily  LORazepam     Tablet 1 milliGRAM(s) Oral three times a day  mirtazapine 15 milliGRAM(s) Oral at bedtime  OLANZapine 5 milliGRAM(s) Oral at bedtime  OXcarbazepine 300 milliGRAM(s) Oral two times a day  polyethylene glycol 3350 17 Gram(s) Oral daily  senna 2 Tablet(s) Oral at bedtime    MEDICATIONS  (PRN):  acetaminophen  Suppository .. 650 milliGRAM(s) Rectal every 6 hours PRN Temp greater or equal to 38C (100.4F), Mild Pain (1 - 3), Moderate Pain (4 - 6)  ALBUTerol    90 MICROgram(s) HFA Inhaler 2 Puff(s) Inhalation every 6 hours PRN Shortness of Breath and/or Wheezing  bisacodyl Suppository 10 milliGRAM(s) Rectal daily PRN Constipation  guaiFENesin   Syrup  (Sugar-Free) 200 milliGRAM(s) Oral every 6 hours PRN Cough    Pertinent Labs: N/A      Skin: Intact per documentation     Nutrition focused physical exam not conducted at this time- found signs of malnutrition [ ]absent [ ]present    Subcutaneous fat loss: [ ] Orbital fat pads region, [ ]Buccal fat region, [ ]Triceps region,  [ ]Ribs region    Muscle wasting: [ ]Temples region, [ ]Clavicle region, [ ]Shoulder region, [ ]Scapula region, [ ]Interosseous region,  [ ]thigh region, [ ]Calf region    Estimated Needs:   [x ] no change since previous assessment  [ ] recalculated:     Current Nutrition Diagnosis: Pt remains at nutrition risk secondary to increased nutrient needs related to increased physiological demand to maintain nutrition status as evidenced by acute respiratory failure secondary to COVID and acute metabolic/toxic encephalopathy with poor po intake, now with dysphagia requiring a pureed diet with nectar thick liquids per SLP. Tube feeds now discontinued. Per documentation, family does not want PEG- seen by SLP yesterday 4/29 with recommendations for a pureed diet and nectar thick liquids. Pt needs total assistance at meals, has been with good po intake at this time. Last BM 4/27 per documentation.     Recommendations:   1) Continue diet as tolerated/per SLP recommendations.  2) Add Ensure Pudding TID to optimize po intake and provide an additional 170 kcal, 4g protein per serving.  3) Continue vit B12 supplementation, add MVI, thiamine, and vit C.  4) Continue bowel regimen PRN.  5) Encourage po intake, monitor diet tolerance, and provide assistance at all meals.  6) Obtain daily weights to monitor trends.     Monitoring and Evaluation:   [ x] PO intake [ x] Tolerance to diet prescription [X] Weights  [X] Follow up per protocol [X] Labs.

## 2020-04-30 NOTE — PROGRESS NOTE ADULT - ASSESSMENT
64 yo F w/ hx bipolar disorder, intermittently nonverbal presents to ER for persistent fevers and new hypoxia (80's on room air) since being diagnosed with COVID week prior as outpatient. In the ED patient as noted to be hypoxic at 85% RA and improved with 100% o2 with 4 L NC.     1. Acute metabolic/toxic encephalopathy  - Multifactorial, hypernatremia, metabolic encephalopathy, possible catatonia  - Improving, today more awake/alert/conversing   - CT head neg  - EEG with epileptiform activity but no clinical seizure. Continue trileptal   - Neuro following, recs appreciated  - Psych consult appreciated, c/w Remeron ativan (increased to TID), zyprexa, trileptal. Zyprexa reduced to 5mg PO QD  - On cogentin  - Speech and swallow follow up advanced diet to puree with nectar thick liquids     2. Acute hypoxic resp failure due to COVID19  - Improving  - Completed Plaquenil course  - Inflammatory markers downtrending, monitor     3. ALVARO vs CKD with hypernatremia  - Likely CKD per daughter history, lithium induced (no longer on Lithium)  - Cr bumped up  - Sodium 153 today  - S/p DDVAP; Will add D5 IVF  - Nephrology following  - Monitor BMP    4. Bipolar disorder  - Continue home meds; for years was on lithium which controlled symptoms but was stopped due to kidney damage; she has struggled to treat bipolar since then  - She is no longer on lithium  - Psych consult appreciated, continue Remeron, Trileptal, Ativan and Zyprexa. Zyprexa reduced to 5mg PO QD given decreased sensorium     5. Hypothyroid  - C/w synthroid    6. Dysphagia   - As per GOC discussion with family/palliative, family does not want peg  - S&S following - advanced to puree with nectar thick liquids    7. Malnourished  - Plan as above    #DVT ppx - lovenox daily    DISPO: Hopeful to discharge home to resume 24 hour home aids. Need to wean supplemental O2. PT to follow up. Case management working on setting up A

## 2020-05-01 LAB
ANION GAP SERPL CALC-SCNC: 13 MMOL/L — SIGNIFICANT CHANGE UP (ref 5–17)
BUN SERPL-MCNC: 32 MG/DL — HIGH (ref 8–20)
CALCIUM SERPL-MCNC: 10.6 MG/DL — HIGH (ref 8.6–10.2)
CHLORIDE SERPL-SCNC: 113 MMOL/L — HIGH (ref 98–107)
CO2 SERPL-SCNC: 29 MMOL/L — SIGNIFICANT CHANGE UP (ref 22–29)
CREAT SERPL-MCNC: 1.27 MG/DL — SIGNIFICANT CHANGE UP (ref 0.5–1.3)
GLUCOSE SERPL-MCNC: 96 MG/DL — SIGNIFICANT CHANGE UP (ref 70–99)
POTASSIUM SERPL-MCNC: 4.9 MMOL/L — SIGNIFICANT CHANGE UP (ref 3.5–5.3)
POTASSIUM SERPL-SCNC: 4.9 MMOL/L — SIGNIFICANT CHANGE UP (ref 3.5–5.3)
SODIUM SERPL-SCNC: 155 MMOL/L — HIGH (ref 135–145)

## 2020-05-01 PROCEDURE — 99232 SBSQ HOSP IP/OBS MODERATE 35: CPT | Mod: CS

## 2020-05-01 PROCEDURE — 99233 SBSQ HOSP IP/OBS HIGH 50: CPT | Mod: CS

## 2020-05-01 RX ORDER — SODIUM CHLORIDE 9 MG/ML
1000 INJECTION, SOLUTION INTRAVENOUS
Refills: 0 | Status: DISCONTINUED | OUTPATIENT
Start: 2020-05-01 | End: 2020-05-04

## 2020-05-01 RX ADMIN — Medication 500 MILLIGRAM(S): at 13:17

## 2020-05-01 RX ADMIN — Medication 1 MILLIGRAM(S): at 21:26

## 2020-05-01 RX ADMIN — ENOXAPARIN SODIUM 40 MILLIGRAM(S): 100 INJECTION SUBCUTANEOUS at 13:17

## 2020-05-01 RX ADMIN — Medication 1 MILLIGRAM(S): at 13:17

## 2020-05-01 RX ADMIN — POLYETHYLENE GLYCOL 3350 17 GRAM(S): 17 POWDER, FOR SOLUTION ORAL at 13:17

## 2020-05-01 RX ADMIN — Medication 100 MILLIGRAM(S): at 13:17

## 2020-05-01 RX ADMIN — SODIUM CHLORIDE 125 MILLILITER(S): 9 INJECTION, SOLUTION INTRAVENOUS at 21:31

## 2020-05-01 RX ADMIN — Medication 1 MILLIGRAM(S): at 05:16

## 2020-05-01 RX ADMIN — PREGABALIN 1000 MICROGRAM(S): 225 CAPSULE ORAL at 13:17

## 2020-05-01 RX ADMIN — OLANZAPINE 5 MILLIGRAM(S): 15 TABLET, FILM COATED ORAL at 21:27

## 2020-05-01 RX ADMIN — SENNA PLUS 2 TABLET(S): 8.6 TABLET ORAL at 21:28

## 2020-05-01 RX ADMIN — OXCARBAZEPINE 300 MILLIGRAM(S): 300 TABLET, FILM COATED ORAL at 05:16

## 2020-05-01 RX ADMIN — OXCARBAZEPINE 300 MILLIGRAM(S): 300 TABLET, FILM COATED ORAL at 17:43

## 2020-05-01 RX ADMIN — Medication 0.5 MILLIGRAM(S): at 05:16

## 2020-05-01 RX ADMIN — Medication 0.5 MILLIGRAM(S): at 17:43

## 2020-05-01 RX ADMIN — SODIUM CHLORIDE 125 MILLILITER(S): 9 INJECTION, SOLUTION INTRAVENOUS at 12:02

## 2020-05-01 RX ADMIN — Medication 75 MICROGRAM(S): at 05:16

## 2020-05-01 RX ADMIN — Medication 1 TABLET(S): at 13:17

## 2020-05-01 RX ADMIN — MIRTAZAPINE 15 MILLIGRAM(S): 45 TABLET, ORALLY DISINTEGRATING ORAL at 21:27

## 2020-05-01 NOTE — PROGRESS NOTE ADULT - ASSESSMENT
66 yo F w/ hx bipolar disorder, intermittently nonverbal presents to ER for persistent fevers and new hypoxia (80's on room air) since being diagnosed with COVID week prior as outpatient. In the ED patient as noted to be hypoxic at 85% RA and improved with 100% o2 with 4 L NC.     1. Acute metabolic/toxic encephalopathy  - Multifactorial, hypernatremia, metabolic encephalopathy, possible catatonia  - Improving, now more awake/alert/conversing   - CT head neg  - EEG with epileptiform activity but no clinical seizure. Continue trileptal   - Neuro following, recs appreciated  - Psych consult appreciated, c/w Remeron, ativan, zyprexa, trileptal.   - On cogentin  - Speech and swallow follow up advanced diet to puree with nectar thick liquids     2. Acute hypoxic resp failure due to COVID19 - much improved - satting well on room air.  - Completed Plaquenil course  - Inflammatory markers downtrending, monitor     3. ALVARO vs CKD with hypernatremia  - Likely CKD per daughter history, lithium induced (no longer on Lithium)  -   - S/p DDVAP; Will add D5 IVF  - Nephrology following  - Monitor BMP    4. Bipolar disorder  - Continue home meds; for years was on lithium which controlled symptoms but was stopped due to kidney damage; she has struggled to treat bipolar since then  - She is no longer on lithium  - Psych consult appreciated, continue Remeron, Trileptal, Ativan and Zyprexa. Zyprexa reduced to 5mg PO QD given decreased sensorium     5. Hypothyroid  - C/w synthroid    6. Dysphagia   - As per [facility] discussion with family/palliative, family does not want peg  - S&S following - advanced to puree with nectar thick liquids    7. Malnourished  - Plan as above    #DVT ppx - lovenox daily    DISPO: Hopeful to discharge home to resume 24 hour home aids. PT to follow up. Case management working on setting up A 64 yo F w/ hx bipolar disorder, intermittently nonverbal presents to ER for persistent fevers and new hypoxia (80's on room air) since being diagnosed with COVID week prior as outpatient. In the ED patient as noted to be hypoxic at 85% RA and improved with 100% o2 with 4 L NC.     1. Acute metabolic/toxic encephalopathy  - Multifactorial, hypernatremia, metabolic encephalopathy, possible catatonia  - Improving, now more awake/alert/conversing   - CT head neg  - EEG with epileptiform activity but no clinical seizure. Continue trileptal   - Neuro following, recs appreciated  - Psych consult appreciated, c/w Remeron, ativan, zyprexa, trileptal.   - On cogentin  - Speech and swallow follow up advanced diet to puree with nectar thick liquids     2. Acute hypoxic resp failure due to COVID19 - much improved - satting well on room air.  - Completed Plaquenil course  - Inflammatory markers downtrending, monitor     3. ALVARO vs CKD with hypernatremia.  Cr stable.  Na higher today.  - Likely CKD per daughter history, lithium induced (no longer on Lithium)  - Appreciate renal input - fluids adjusted.  - Monitor BMP    4. Bipolar disorder  - Continue home meds; for years was on lithium which controlled symptoms but was stopped due to kidney damage; she has struggled to treat bipolar since then  - She is no longer on lithium  - Psych consult appreciated, continue Remeron, Trileptal, Ativan and Zyprexa. Zyprexa reduced to 5mg PO QD given decreased sensorium     5. Hypothyroid  - C/w synthroid    6. Dysphagia   - As per [facility] discussion with family/palliative, family does not want peg  - S&S following - advanced to puree with nectar thick liquids    7. Malnourished  - Plan as above    #DVT ppx - lovenox daily    DISPO: Hopeful to discharge home to resume 24 hour home aids. PT to follow up. Case management working on setting up A

## 2020-05-01 NOTE — PROGRESS NOTE ADULT - SUBJECTIVE AND OBJECTIVE BOX
INTERVAL HPI/OVERNIGHT EVENTS:  Patient seen and examined lying in bed.  Patient again requesting water.  A&O x 1 (able to provide her name).  Satting 92-94% on room air.  Afebrile.    MEDICATIONS  (STANDING):  ascorbic acid 500 milliGRAM(s) Oral daily  benztropine 0.5 milliGRAM(s) Oral two times a day  cyanocobalamin 1000 MICROGram(s) Oral daily  dextrose 5%. 1000 milliLiter(s) (125 mL/Hr) IV Continuous <Continuous>  enoxaparin Injectable 40 milliGRAM(s) SubCutaneous daily  folic acid 1 milliGRAM(s) Oral daily  levothyroxine 75 MICROGram(s) Oral daily  LORazepam     Tablet 1 milliGRAM(s) Oral three times a day  mirtazapine 15 milliGRAM(s) Oral at bedtime  multivitamin/minerals 1 Tablet(s) Oral daily  OLANZapine 5 milliGRAM(s) Oral at bedtime  OXcarbazepine 300 milliGRAM(s) Oral two times a day  polyethylene glycol 3350 17 Gram(s) Oral daily  senna 2 Tablet(s) Oral at bedtime  thiamine 100 milliGRAM(s) Oral daily    MEDICATIONS  (PRN):  acetaminophen  Suppository .. 650 milliGRAM(s) Rectal every 6 hours PRN Temp greater or equal to 38C (100.4F), Mild Pain (1 - 3), Moderate Pain (4 - 6)  ALBUTerol    90 MICROgram(s) HFA Inhaler 2 Puff(s) Inhalation every 6 hours PRN Shortness of Breath and/or Wheezing  bisacodyl Suppository 10 milliGRAM(s) Rectal daily PRN Constipation  guaiFENesin   Syrup  (Sugar-Free) 200 milliGRAM(s) Oral every 6 hours PRN Cough      Allergies    Depakote (Hives)  lithium (Hives)        Vital Signs Last 24 Hrs  T(C): 36.5 (01 May 2020 07:15), Max: 36.9 (30 Apr 2020 23:30)  T(F): 97.7 (01 May 2020 07:15), Max: 98.4 (30 Apr 2020 23:30)  HR: 137 (01 May 2020 07:15) (80 - 137)  BP: 94/52 (01 May 2020 07:15) (94/52 - 134/82)  BP(mean): --  RR: 19 (01 May 2020 07:15) (19 - 19)  SpO2: 94% (30 Apr 2020 23:30) (92% - 94%)        LABS:    05-01    155<H>  |  113<H>  |  32.0<H>  ----------------------------<  96  4.9   |  29.0  |  1.27    Ca    10.6<H>      01 May 2020 06:33              RADIOLOGY & ADDITIONAL TESTS:

## 2020-05-01 NOTE — PROGRESS NOTE ADULT - ASSESSMENT
1. Hypernatremia (SNa+ 140--> 161)  2. ALVARO  3. COVID 19+     Hypernatremia in setting of poor PO intake  improving with hypotonic IVF  Continue free water supplementation  Monitor Scr, lytes and UOP  Nephrology signing off; please reconsult prn 1. Hypernatremia (SNa+ 140--> 161)  2. ALVARO vs ALVARO on CKD  3. COVID 19+     Worsening hypernatremia; switch fluids to D5W  Scr stable  Monitor Scr, lytes and UOP

## 2020-05-01 NOTE — PROGRESS NOTE ADULT - SUBJECTIVE AND OBJECTIVE BOX
CC: Hypoxia    INTERVAL HPI/OVERNIGHT EVENTS:  Patient seen and examined at bedside.  Patient without complaints. ROS negative.  Patient asking for water.     PHYSICAL EXAM:  Vital Signs Last 24 Hrs  T(C): 36.5 (01 May 2020 07:15), Max: 36.9 (30 Apr 2020 23:30)  T(F): 97.7 (01 May 2020 07:15), Max: 98.4 (30 Apr 2020 23:30)  HR: 137 (01 May 2020 07:15) (80 - 137)  BP: 94/52 (01 May 2020 07:15) (94/52 - 134/82)  BP(mean): --  RR: 19 (01 May 2020 07:15) (19 - 19)  SpO2: 94% (30 Apr 2020 23:30) (92% - 94%)    GENERAL: NAD, lying comfortably  HEAD: Atraumatic, Normocephalic  EYES: EOMI, PERRLA, conjunctiva and sclera clear  ENMT: Moist mucous membranes  NECK: Supple, No JVD  NERVOUS SYSTEM: A&Ox1, b/l UE tremors noted  CHEST/LUNG: Clear to auscultation b/l; Unlabored respirations  HEART: S1S2+, Regular rate and rhythm  ABDOMEN: Soft, Nontender, Nondistended; BS+  EXTREMITIES: 2+ Peripheral Pulses, No LE edema  SKIN: Warm and dry      LABS:    05-01    155<H>  |  113<H>  |  32.0<H>  ----------------------------<  96  4.9   |  29.0  |  1.27    Ca    10.6<H>      01 May 2020 06:33

## 2020-05-01 NOTE — PROGRESS NOTE ADULT - SUBJECTIVE AND OBJECTIVE BOX
[facility] DIVISION OF KIDNEY DISEASES AND HYPERTENSION -- FOLLOW UP NOTE  --------------------------------------------------------------------------------  Chief Complaint:    24 hour events/subjective:        PAST HISTORY  --------------------------------------------------------------------------------  No significant changes to PMH, PSH, FHx, SHx, unless otherwise noted    ALLERGIES & MEDICATIONS  --------------------------------------------------------------------------------  Allergies    Depakote (Hives)  lithium (Hives)    Intolerances      Standing Inpatient Medications  ascorbic acid 500 milliGRAM(s) Oral daily  benztropine 0.5 milliGRAM(s) Oral two times a day  cyanocobalamin 1000 MICROGram(s) Oral daily  enoxaparin Injectable 40 milliGRAM(s) SubCutaneous daily  folic acid 1 milliGRAM(s) Oral daily  levothyroxine 75 MICROGram(s) Oral daily  LORazepam     Tablet 1 milliGRAM(s) Oral three times a day  mirtazapine 15 milliGRAM(s) Oral at bedtime  multivitamin/minerals 1 Tablet(s) Oral daily  OLANZapine 5 milliGRAM(s) Oral at bedtime  OXcarbazepine 300 milliGRAM(s) Oral two times a day  polyethylene glycol 3350 17 Gram(s) Oral daily  senna 2 Tablet(s) Oral at bedtime  thiamine 100 milliGRAM(s) Oral daily    PRN Inpatient Medications  acetaminophen  Suppository .. 650 milliGRAM(s) Rectal every 6 hours PRN  ALBUTerol    90 MICROgram(s) HFA Inhaler 2 Puff(s) Inhalation every 6 hours PRN  bisacodyl Suppository 10 milliGRAM(s) Rectal daily PRN  guaiFENesin   Syrup  (Sugar-Free) 200 milliGRAM(s) Oral every 6 hours PRN      REVIEW OF SYSTEMS  --------------------------------------------------------------------------------  Gen: No weight changes, fatigue, fevers/chills, weakness  Skin: No rashes  Head/Eyes/Ears/Mouth: No headache; Normal hearing; Normal vision w/o blurriness; No sinus pain/discomfort, sore throat  Respiratory: No dyspnea, cough, wheezing, hemoptysis  CV: No chest pain, PND, orthopnea  GI: No abdominal pain, diarrhea, constipation, nausea, vomiting, melena, hematochezia  : No increased frequency, dysuria, hematuria, nocturia  MSK: No joint pain/swelling; no back pain; no edema  Neuro: No dizziness/lightheadedness, weakness, seizures, numbness, tingling  Heme: No easy bruising or bleeding  Endo: No heat/cold intolerance  Psych: No significant nervousness, anxiety, stress, depression    All other systems were reviewed and are negative, except as noted.    VITALS/PHYSICAL EXAM  --------------------------------------------------------------------------------  T(C): 36.5 (05-01-20 @ 07:15), Max: 36.9 (04-30-20 @ 23:30)  HR: 137 (05-01-20 @ 07:15) (80 - 137)  BP: 94/52 (05-01-20 @ 07:15) (94/52 - 134/82)  RR: 19 (05-01-20 @ 07:15) (19 - 19)  SpO2: 94% (04-30-20 @ 23:30) (92% - 94%)  Wt(kg): --        04-30-20 @ 07:01  -  05-01-20 @ 07:00  --------------------------------------------------------  IN: 120 mL / OUT: 0 mL / NET: 120 mL      Physical Exam:  	Gen: NAD, well-appearing  	HEENT: PERRL, supple neck, clear oropharynx  	Pulm: CTA B/L  	CV: RRR, S1S2; no rub  	Back: No spinal or CVA tenderness; no sacral edema  	Abd: +BS, soft, nontender/nondistended  	: No suprapubic tenderness  	UE: Warm, FROM, no clubbing, intact strength; no edema; no asterixis  	LE: Warm, FROM, no clubbing, intact strength; no edema  	Neuro: No focal deficits, intact gait  	Psych: Normal affect and mood  	Skin: Warm, without rashes  	Vascular access:    LABS/STUDIES  --------------------------------------------------------------------------------    155  |  113  |  32.0  ----------------------------<  96      [05-01-20 @ 06:33]  4.9   |  29.0  |  1.27        Ca     10.6     [05-01-20 @ 06:33]        Creatinine Trend:  SCr 1.27 [05-01 @ 06:33]  SCr 1.36 [04-30 @ 08:23]  SCr 1.26 [04-29 @ 07:48]  SCr 0.99 [04-28 @ 07:42]  SCr 1.00 [04-27 @ 06:25]    Urinalysis - [04-28-20 @ 14:30]      Color Yellow / Appearance Clear / SG 1.005 / pH 7.0      Gluc Negative / Ketone Negative  / Bili Negative / Urobili Negative       Blood Negative / Protein Negative / Leuk Est Small / Nitrite Negative      RBC 0-2 / WBC 0-2 / Hyaline  / Gran  / Sq Epi  / Non Sq Epi Occasional / Bacteria Occasional    Urine Sodium <30      [04-28-20 @ 14:30]  Urine Potassium 14      [04-28-20 @ 14:30]  Urine Osmolality 148      [04-28-20 @ 14:30]    Ferritin 362      [04-25-20 @ 07:40]  PTH -- (Ca 9.7)      [04-25-20 @ 19:09]   73  TSH 0.21      [04-23-20 @ 14:28]    HCV 0.10, Nonreact      [04-21-20 @ 16:36] [facility] DIVISION OF KIDNEY DISEASES AND HYPERTENSION -- FOLLOW UP NOTE  --------------------------------------------------------------------------------  Chief Complaint: hypernatremia    24 hour events/subjective:  nephrology reconsulted for hypernatremia      PAST HISTORY  --------------------------------------------------------------------------------  No significant changes to PMH, PSH, FHx, SHx, unless otherwise noted    ALLERGIES & MEDICATIONS  --------------------------------------------------------------------------------  Allergies    Depakote (Hives)  lithium (Hives)    Intolerances      Standing Inpatient Medications  ascorbic acid 500 milliGRAM(s) Oral daily  benztropine 0.5 milliGRAM(s) Oral two times a day  cyanocobalamin 1000 MICROGram(s) Oral daily  enoxaparin Injectable 40 milliGRAM(s) SubCutaneous daily  folic acid 1 milliGRAM(s) Oral daily  levothyroxine 75 MICROGram(s) Oral daily  LORazepam     Tablet 1 milliGRAM(s) Oral three times a day  mirtazapine 15 milliGRAM(s) Oral at bedtime  multivitamin/minerals 1 Tablet(s) Oral daily  OLANZapine 5 milliGRAM(s) Oral at bedtime  OXcarbazepine 300 milliGRAM(s) Oral two times a day  polyethylene glycol 3350 17 Gram(s) Oral daily  senna 2 Tablet(s) Oral at bedtime  thiamine 100 milliGRAM(s) Oral daily    PRN Inpatient Medications  acetaminophen  Suppository .. 650 milliGRAM(s) Rectal every 6 hours PRN  ALBUTerol    90 MICROgram(s) HFA Inhaler 2 Puff(s) Inhalation every 6 hours PRN  bisacodyl Suppository 10 milliGRAM(s) Rectal daily PRN  guaiFENesin   Syrup  (Sugar-Free) 200 milliGRAM(s) Oral every 6 hours PRN      REVIEW OF SYSTEMS  --------------------------------------------------------------------------------  unable to obtain    VITALS/PHYSICAL EXAM  --------------------------------------------------------------------------------  T(C): 36.5 (05-01-20 @ 07:15), Max: 36.9 (04-30-20 @ 23:30)  HR: 137 (05-01-20 @ 07:15) (80 - 137)  BP: 94/52 (05-01-20 @ 07:15) (94/52 - 134/82)  RR: 19 (05-01-20 @ 07:15) (19 - 19)  SpO2: 94% (04-30-20 @ 23:30) (92% - 94%)  Wt(kg): --        04-30-20 @ 07:01  -  05-01-20 @ 07:00  --------------------------------------------------------  IN: 120 mL / OUT: 0 mL / NET: 120 mL      Physical Exam:  	Due to the nature of the pt's isolation status, no bedside physical exam done to limit spread of infection. Objective data was reviewed in detail.    LABS/STUDIES  --------------------------------------------------------------------------------    155  |  113  |  32.0  ----------------------------<  96      [05-01-20 @ 06:33]  4.9   |  29.0  |  1.27        Ca     10.6     [05-01-20 @ 06:33]        Creatinine Trend:  SCr 1.27 [05-01 @ 06:33]  SCr 1.36 [04-30 @ 08:23]  SCr 1.26 [04-29 @ 07:48]  SCr 0.99 [04-28 @ 07:42]  SCr 1.00 [04-27 @ 06:25]    Urinalysis - [04-28-20 @ 14:30]      Color Yellow / Appearance Clear / SG 1.005 / pH 7.0      Gluc Negative / Ketone Negative  / Bili Negative / Urobili Negative       Blood Negative / Protein Negative / Leuk Est Small / Nitrite Negative      RBC 0-2 / WBC 0-2 / Hyaline  / Gran  / Sq Epi  / Non Sq Epi Occasional / Bacteria Occasional    Urine Sodium <30      [04-28-20 @ 14:30]  Urine Potassium 14      [04-28-20 @ 14:30]  Urine Osmolality 148      [04-28-20 @ 14:30]    Ferritin 362      [04-25-20 @ 07:40]  PTH -- (Ca 9.7)      [04-25-20 @ 19:09]   73  TSH 0.21      [04-23-20 @ 14:28]    HCV 0.10, Nonreact      [04-21-20 @ 16:36]

## 2020-05-02 LAB
ANION GAP SERPL CALC-SCNC: 17 MMOL/L — SIGNIFICANT CHANGE UP (ref 5–17)
BUN SERPL-MCNC: 28 MG/DL — HIGH (ref 8–20)
CALCIUM SERPL-MCNC: 11.2 MG/DL — HIGH (ref 8.6–10.2)
CHLORIDE SERPL-SCNC: 108 MMOL/L — HIGH (ref 98–107)
CO2 SERPL-SCNC: 26 MMOL/L — SIGNIFICANT CHANGE UP (ref 22–29)
CREAT SERPL-MCNC: 1.26 MG/DL — SIGNIFICANT CHANGE UP (ref 0.5–1.3)
GLUCOSE BLDC GLUCOMTR-MCNC: 112 MG/DL — HIGH (ref 70–99)
GLUCOSE SERPL-MCNC: 86 MG/DL — SIGNIFICANT CHANGE UP (ref 70–99)
HCT VFR BLD CALC: 34.7 % — SIGNIFICANT CHANGE UP (ref 34.5–45)
HGB BLD-MCNC: 10.3 G/DL — LOW (ref 11.5–15.5)
MCHC RBC-ENTMCNC: 29.7 GM/DL — LOW (ref 32–36)
MCHC RBC-ENTMCNC: 30.3 PG — SIGNIFICANT CHANGE UP (ref 27–34)
MCV RBC AUTO: 102.1 FL — HIGH (ref 80–100)
PLATELET # BLD AUTO: 239 K/UL — SIGNIFICANT CHANGE UP (ref 150–400)
POTASSIUM SERPL-MCNC: 4.5 MMOL/L — SIGNIFICANT CHANGE UP (ref 3.5–5.3)
POTASSIUM SERPL-SCNC: 4.5 MMOL/L — SIGNIFICANT CHANGE UP (ref 3.5–5.3)
RBC # BLD: 3.4 M/UL — LOW (ref 3.8–5.2)
RBC # FLD: 14 % — SIGNIFICANT CHANGE UP (ref 10.3–14.5)
SODIUM SERPL-SCNC: 151 MMOL/L — HIGH (ref 135–145)
WBC # BLD: 7.19 K/UL — SIGNIFICANT CHANGE UP (ref 3.8–10.5)
WBC # FLD AUTO: 7.19 K/UL — SIGNIFICANT CHANGE UP (ref 3.8–10.5)

## 2020-05-02 PROCEDURE — 99232 SBSQ HOSP IP/OBS MODERATE 35: CPT | Mod: CS

## 2020-05-02 RX ADMIN — OXCARBAZEPINE 300 MILLIGRAM(S): 300 TABLET, FILM COATED ORAL at 05:00

## 2020-05-02 RX ADMIN — ENOXAPARIN SODIUM 40 MILLIGRAM(S): 100 INJECTION SUBCUTANEOUS at 12:38

## 2020-05-02 RX ADMIN — Medication 1 MILLIGRAM(S): at 21:12

## 2020-05-02 RX ADMIN — Medication 100 MILLIGRAM(S): at 12:38

## 2020-05-02 RX ADMIN — MIRTAZAPINE 15 MILLIGRAM(S): 45 TABLET, ORALLY DISINTEGRATING ORAL at 21:13

## 2020-05-02 RX ADMIN — OLANZAPINE 5 MILLIGRAM(S): 15 TABLET, FILM COATED ORAL at 21:13

## 2020-05-02 RX ADMIN — Medication 1 MILLIGRAM(S): at 05:00

## 2020-05-02 RX ADMIN — Medication 500 MILLIGRAM(S): at 12:38

## 2020-05-02 RX ADMIN — SENNA PLUS 2 TABLET(S): 8.6 TABLET ORAL at 21:13

## 2020-05-02 RX ADMIN — Medication 75 MICROGRAM(S): at 05:00

## 2020-05-02 RX ADMIN — Medication 1 MILLIGRAM(S): at 12:38

## 2020-05-02 RX ADMIN — Medication 0.5 MILLIGRAM(S): at 16:03

## 2020-05-02 RX ADMIN — POLYETHYLENE GLYCOL 3350 17 GRAM(S): 17 POWDER, FOR SOLUTION ORAL at 12:38

## 2020-05-02 RX ADMIN — OXCARBAZEPINE 300 MILLIGRAM(S): 300 TABLET, FILM COATED ORAL at 16:03

## 2020-05-02 RX ADMIN — SODIUM CHLORIDE 125 MILLILITER(S): 9 INJECTION, SOLUTION INTRAVENOUS at 22:18

## 2020-05-02 RX ADMIN — Medication 0.5 MILLIGRAM(S): at 05:00

## 2020-05-02 RX ADMIN — Medication 1 TABLET(S): at 12:38

## 2020-05-02 RX ADMIN — PREGABALIN 1000 MICROGRAM(S): 225 CAPSULE ORAL at 16:03

## 2020-05-02 RX ADMIN — Medication 1 MILLIGRAM(S): at 12:39

## 2020-05-02 RX ADMIN — SODIUM CHLORIDE 125 MILLILITER(S): 9 INJECTION, SOLUTION INTRAVENOUS at 20:07

## 2020-05-02 NOTE — PROGRESS NOTE ADULT - ASSESSMENT
64 yo F w/ hx bipolar disorder, intermittently nonverbal presents to ER for persistent fevers and new hypoxia (80's on room air) since being diagnosed with COVID as outpatient. In the ED patient noted to be hypoxic at 85% RA and improved with 100% O2 with 4 L NC. Patient was noted to be hypernatremic during hospital course due to catatonia and poor PO intake. Required NGT for feeding and corrected hypernatremia levels with free water and IV fluids. Nephrology and neurology were consulted. Patient was ruled out for seizure activity as a cause of encephalopathy. Patient is currently back on an oral diet with thickened fluids. Psych meds adjusted.    #Acute metabolic/toxic encephalopathy  - Multifactorial, hypernatremia, metabolic encephalopathy, possible catatonia  - Improving, more awake/alert   - CT head neg  - EEG with epileptiform activity but no clinical seizure. Continue trileptal. Signed off by Neuro  - Psych consult appreciated, c/w Remeron, ativan, zyprexa, trileptal.   - C/w cogentin    #Acute hypoxic resp failure due to COVID19  - Improving  - Completed Plaquenil course    #ALVARO vs CKD with hypernatremia  - Likely CKD per daughter history, lithium induced (no longer on Lithium)  - Renal signed off - c/w D5W  - S/p DDVAP  - Monitor BMP    #Bipolar disorder  - Continue home meds; for years was on lithium which controlled symptoms but was stopped due to kidney damage  - Psych consult appreciated, continue Remeron, Trileptal, Ativan and Zyprexa.    #Hypothyroid  - C/w synthroid    #Dysphagia   - As per [facility] discussion with family/palliative, family does not want peg  - Seen by S&S, diet advanced to puree with nectar thick liquids     #Malnourished  - Plan as above    #DVT ppx - Lovenox daily    DISPO: Discharge home to resume 24 hour home aids when able to advance diet. Discussed with case management, agency currently understaffed d/t COVID-19. CM continuing to work on HHA placement.

## 2020-05-02 NOTE — PROGRESS NOTE ADULT - SUBJECTIVE AND OBJECTIVE BOX
CC: hypoxia (01 May 2020 14:46)    INTERVAL HPI/OVERNIGHT EVENTS:    Vital Signs Last 24 Hrs  T(C): 36.4 (02 May 2020 09:17), Max: 36.7 (01 May 2020 15:38)  T(F): 97.6 (02 May 2020 09:17), Max: 98 (01 May 2020 15:38)  HR: 82 (02 May 2020 09:17) (76 - 83)  BP: 97/57 (02 May 2020 09:17) (97/57 - 102/65)  BP(mean): --  RR: 20 (02 May 2020 09:17) (18 - 20)  SpO2: 92% (02 May 2020 09:17) (92% - 94%)    PHYSICAL EXAM:  General: Well developed; well nourished; in no acute distress  Eyes: PERRLA, EOMI; conjunctiva and sclera clear  Head: Normocephalic; atraumatic  ENMT: No nasal discharge; airway clear  Neck: Supple; non tender; no masses  Respiratory: No wheezes, rales or rhonchi  Cardiovascular: Regular rate and rhythm. S1 and S2 Normal; No murmurs, gallops or rubs  Gastrointestinal: Soft non-tender non-distended; Normal bowel sounds  Genitourinary: No costovertebral angle tenderness  Extremities: Normal range of motion, No clubbing, cyanosis or edema  Vascular: Peripheral pulses palpable 2+ bilaterally  Neurological: Alert and oriented x4  Skin: Warm and dry. No acute rash  Lymph Nodes: No acute cervical adenopathy  Musculoskeletal: Normal gait, tone, without deformities  Psychiatric: Cooperative and appropriate    I&O's Detail    01 May 2020 07:01  -  02 May 2020 07:00  --------------------------------------------------------  IN:    dextrose 5%.: 487 mL  Total IN: 487 mL    OUT:  Total OUT: 0 mL    Total NET: 487 mL                        10.3   7.19  )-----------( 239      ( 02 May 2020 08:19 )             34.7     02 May 2020 08:19    151    |  108    |  28.0   ----------------------------<  86     4.5     |  26.0   |  1.26     Ca    11.2       02 May 2020 08:19    CAPILLARY BLOOD GLUCOSE    MEDICATIONS  (STANDING):  ascorbic acid 500 milliGRAM(s) Oral daily  benztropine 0.5 milliGRAM(s) Oral two times a day  cyanocobalamin 1000 MICROGram(s) Oral daily  dextrose 5%. 1000 milliLiter(s) (125 mL/Hr) IV Continuous <Continuous>  enoxaparin Injectable 40 milliGRAM(s) SubCutaneous daily  folic acid 1 milliGRAM(s) Oral daily  levothyroxine 75 MICROGram(s) Oral daily  LORazepam     Tablet 1 milliGRAM(s) Oral three times a day  mirtazapine 15 milliGRAM(s) Oral at bedtime  multivitamin/minerals 1 Tablet(s) Oral daily  OLANZapine 5 milliGRAM(s) Oral at bedtime  OXcarbazepine 300 milliGRAM(s) Oral two times a day  polyethylene glycol 3350 17 Gram(s) Oral daily  senna 2 Tablet(s) Oral at bedtime  thiamine 100 milliGRAM(s) Oral daily    MEDICATIONS  (PRN):  acetaminophen  Suppository .. 650 milliGRAM(s) Rectal every 6 hours PRN Temp greater or equal to 38C (100.4F), Mild Pain (1 - 3), Moderate Pain (4 - 6)  ALBUTerol    90 MICROgram(s) HFA Inhaler 2 Puff(s) Inhalation every 6 hours PRN Shortness of Breath and/or Wheezing  bisacodyl Suppository 10 milliGRAM(s) Rectal daily PRN Constipation  guaiFENesin   Syrup  (Sugar-Free) 200 milliGRAM(s) Oral every 6 hours PRN Cough    RADIOLOGY & ADDITIONAL TESTS:

## 2020-05-03 LAB
ANION GAP SERPL CALC-SCNC: 12 MMOL/L — SIGNIFICANT CHANGE UP (ref 5–17)
BUN SERPL-MCNC: 24 MG/DL — HIGH (ref 8–20)
CALCIUM SERPL-MCNC: 10.5 MG/DL — HIGH (ref 8.6–10.2)
CHLORIDE SERPL-SCNC: 101 MMOL/L — SIGNIFICANT CHANGE UP (ref 98–107)
CO2 SERPL-SCNC: 27 MMOL/L — SIGNIFICANT CHANGE UP (ref 22–29)
CREAT SERPL-MCNC: 1.29 MG/DL — SIGNIFICANT CHANGE UP (ref 0.5–1.3)
GLUCOSE SERPL-MCNC: 87 MG/DL — SIGNIFICANT CHANGE UP (ref 70–99)
POTASSIUM SERPL-MCNC: 5 MMOL/L — SIGNIFICANT CHANGE UP (ref 3.5–5.3)
POTASSIUM SERPL-SCNC: 5 MMOL/L — SIGNIFICANT CHANGE UP (ref 3.5–5.3)
SODIUM SERPL-SCNC: 140 MMOL/L — SIGNIFICANT CHANGE UP (ref 135–145)

## 2020-05-03 PROCEDURE — 99232 SBSQ HOSP IP/OBS MODERATE 35: CPT | Mod: CS

## 2020-05-03 RX ADMIN — Medication 1 TABLET(S): at 13:55

## 2020-05-03 RX ADMIN — Medication 1 MILLIGRAM(S): at 21:35

## 2020-05-03 RX ADMIN — Medication 0.5 MILLIGRAM(S): at 05:17

## 2020-05-03 RX ADMIN — OXCARBAZEPINE 300 MILLIGRAM(S): 300 TABLET, FILM COATED ORAL at 05:18

## 2020-05-03 RX ADMIN — Medication 500 MILLIGRAM(S): at 14:00

## 2020-05-03 RX ADMIN — OXCARBAZEPINE 300 MILLIGRAM(S): 300 TABLET, FILM COATED ORAL at 17:49

## 2020-05-03 RX ADMIN — SENNA PLUS 2 TABLET(S): 8.6 TABLET ORAL at 21:35

## 2020-05-03 RX ADMIN — Medication 75 MICROGRAM(S): at 05:17

## 2020-05-03 RX ADMIN — ENOXAPARIN SODIUM 40 MILLIGRAM(S): 100 INJECTION SUBCUTANEOUS at 14:00

## 2020-05-03 RX ADMIN — Medication 1 MILLIGRAM(S): at 14:00

## 2020-05-03 RX ADMIN — Medication 100 MILLIGRAM(S): at 13:54

## 2020-05-03 RX ADMIN — Medication 1 MILLIGRAM(S): at 05:18

## 2020-05-03 RX ADMIN — Medication 0.5 MILLIGRAM(S): at 17:49

## 2020-05-03 RX ADMIN — OLANZAPINE 5 MILLIGRAM(S): 15 TABLET, FILM COATED ORAL at 21:35

## 2020-05-03 RX ADMIN — POLYETHYLENE GLYCOL 3350 17 GRAM(S): 17 POWDER, FOR SOLUTION ORAL at 14:00

## 2020-05-03 RX ADMIN — MIRTAZAPINE 15 MILLIGRAM(S): 45 TABLET, ORALLY DISINTEGRATING ORAL at 21:35

## 2020-05-03 RX ADMIN — PREGABALIN 1000 MICROGRAM(S): 225 CAPSULE ORAL at 13:54

## 2020-05-03 NOTE — PROGRESS NOTE ADULT - SUBJECTIVE AND OBJECTIVE BOX
CC: hypoxia (01 May 2020 14:46)    INTERVAL HPI/OVERNIGHT EVENTS:  arm on the right where IV was previously noted to be infiltrated  patient without complaints however  tolerating diet and being fed by nurse    Vital Signs Last 24 Hrs  T(C): 36.3 (03 May 2020 08:33), Max: 37.2 (02 May 2020 15:45)  T(F): 97.4 (03 May 2020 08:33), Max: 99 (02 May 2020 15:45)  HR: 81 (03 May 2020 08:33) (81 - 87)  BP: 92/61 (03 May 2020 08:53) (87/56 - 111/61)  BP(mean): --  RR: 18 (03 May 2020 08:33) (18 - 20)  SpO2: 100% (03 May 2020 08:33) (90% - 100%)    PHYSICAL EXAM  General: in no acute distress; moving freely, less rigid, but still with tremor which is baseline; alert and answers questions  ENMT: No nasal discharge; airway clear; edentulous  Extremities: Normal range of motion, No clubbing, cyanosis; edema of the right upper extremity; no warmth though was under icepack, slightly tender  Vascular: Peripheral pulses palpable 2+ bilaterally  Neurological: Alert  during exam and resists noxious stimuli.      MEDICATIONS  (STANDING):  ascorbic acid 500 milliGRAM(s) Oral daily  benztropine 0.5 milliGRAM(s) Oral two times a day  cyanocobalamin 1000 MICROGram(s) Oral daily  dextrose 5%. 1000 milliLiter(s) (125 mL/Hr) IV Continuous <Continuous>  enoxaparin Injectable 40 milliGRAM(s) SubCutaneous daily  folic acid 1 milliGRAM(s) Oral daily  levothyroxine 75 MICROGram(s) Oral daily  LORazepam     Tablet 1 milliGRAM(s) Oral three times a day  mirtazapine 15 milliGRAM(s) Oral at bedtime  multivitamin/minerals 1 Tablet(s) Oral daily  OLANZapine 5 milliGRAM(s) Oral at bedtime  OXcarbazepine 300 milliGRAM(s) Oral two times a day  polyethylene glycol 3350 17 Gram(s) Oral daily  senna 2 Tablet(s) Oral at bedtime  thiamine 100 milliGRAM(s) Oral daily    MEDICATIONS  (PRN):  acetaminophen  Suppository .. 650 milliGRAM(s) Rectal every 6 hours PRN Temp greater or equal to 38C (100.4F), Mild Pain (1 - 3), Moderate Pain (4 - 6)  ALBUTerol    90 MICROgram(s) HFA Inhaler 2 Puff(s) Inhalation every 6 hours PRN Shortness of Breath and/or Wheezing  bisacodyl Suppository 10 milliGRAM(s) Rectal daily PRN Constipation  guaiFENesin   Syrup  (Sugar-Free) 200 milliGRAM(s) Oral every 6 hours PRN Cough    RADIOLOGY & ADDITIONAL TESTS:

## 2020-05-03 NOTE — PROGRESS NOTE ADULT - ASSESSMENT
64 yo F w/ hx bipolar disorder, intermittently nonverbal presents to ER for persistent fevers and new hypoxia (80's on room air) since being diagnosed with COVID as outpatient. In the ED patient noted to be hypoxic at 85% RA and improved with 100% O2 with 4 L NC. Patient was noted to be hypernatremic during hospital course due to catatonia and poor PO intake. Required NGT for feeding and corrected hypernatremia levels with free water and IV fluids. Nephrology and neurology were consulted. Patient was ruled out for seizure activity as a cause of encephalopathy. Patient is currently back on an oral diet with thickened fluids. Psych meds adjusted.    #Acute metabolic/toxic encephalopathy - improved  - Multifactorial, hypernatremia, metabolic encephalopathy, possible catatonia  - CT head neg  - EEG with epileptiform activity but no clinical seizure. Continue trileptal. Signed off by Neuro  - Psych consult appreciated, c/w Remeron, ativan, zyprexa, trileptal.   - C/w cogentin    #Acute hypoxic resp failure due to COVID19 - improved  - Improving  - Completed Plaquenil course    #ALVARO vs CKD with hypernatremia - ALVARO resolved; hypernatremia improved  - Likely CKD per daughter history, lithium induced (no longer on Lithium)  - Renal signed off - c/w D5W  - S/p DDVAP  - Monitor BMP    #Bipolar disorder  - Continue home meds; for years was on lithium which controlled symptoms but was stopped due to kidney damage  - Psych consult appreciated, continue Remeron, Trileptal, Ativan and Zyprexa.    #Hypothyroid  - C/w synthroid    #Dysphagia   - As per GOC discussion with family/palliative, family does not want peg  - Seen by S&S, diet advanced to puree with nectar thick liquids     #Malnourished  - Plan as above    #DVT ppx - Lovenox daily    DISPO: Discharge home to resume 24 hour home aids when able to advance diet. Discussed with case management, agency currently understaffed d/t COVID-19. CM continuing to work on HHA placement.

## 2020-05-04 PROCEDURE — 99232 SBSQ HOSP IP/OBS MODERATE 35: CPT | Mod: CS

## 2020-05-04 RX ORDER — ASCORBIC ACID 60 MG
1 TABLET,CHEWABLE ORAL
Qty: 0 | Refills: 0 | DISCHARGE
Start: 2020-05-04

## 2020-05-04 RX ORDER — THIAMINE MONONITRATE (VIT B1) 100 MG
1 TABLET ORAL
Qty: 0 | Refills: 0 | DISCHARGE
Start: 2020-05-04

## 2020-05-04 RX ORDER — MULTIVIT-MIN/FERROUS GLUCONATE 9 MG/15 ML
1 LIQUID (ML) ORAL
Qty: 0 | Refills: 0 | DISCHARGE
Start: 2020-05-04

## 2020-05-04 RX ADMIN — OXCARBAZEPINE 300 MILLIGRAM(S): 300 TABLET, FILM COATED ORAL at 18:02

## 2020-05-04 RX ADMIN — POLYETHYLENE GLYCOL 3350 17 GRAM(S): 17 POWDER, FOR SOLUTION ORAL at 13:09

## 2020-05-04 RX ADMIN — Medication 1 TABLET(S): at 13:09

## 2020-05-04 RX ADMIN — Medication 0.5 MILLIGRAM(S): at 18:02

## 2020-05-04 RX ADMIN — OLANZAPINE 5 MILLIGRAM(S): 15 TABLET, FILM COATED ORAL at 20:00

## 2020-05-04 RX ADMIN — Medication 100 MILLIGRAM(S): at 13:04

## 2020-05-04 RX ADMIN — Medication 75 MICROGRAM(S): at 05:48

## 2020-05-04 RX ADMIN — Medication 1 MILLIGRAM(S): at 13:08

## 2020-05-04 RX ADMIN — SENNA PLUS 2 TABLET(S): 8.6 TABLET ORAL at 20:00

## 2020-05-04 RX ADMIN — PREGABALIN 1000 MICROGRAM(S): 225 CAPSULE ORAL at 13:09

## 2020-05-04 RX ADMIN — Medication 500 MILLIGRAM(S): at 13:13

## 2020-05-04 RX ADMIN — OXCARBAZEPINE 300 MILLIGRAM(S): 300 TABLET, FILM COATED ORAL at 05:48

## 2020-05-04 RX ADMIN — Medication 0.5 MILLIGRAM(S): at 05:48

## 2020-05-04 RX ADMIN — ENOXAPARIN SODIUM 40 MILLIGRAM(S): 100 INJECTION SUBCUTANEOUS at 13:09

## 2020-05-04 RX ADMIN — MIRTAZAPINE 15 MILLIGRAM(S): 45 TABLET, ORALLY DISINTEGRATING ORAL at 20:00

## 2020-05-04 NOTE — PROGRESS NOTE ADULT - SUBJECTIVE AND OBJECTIVE BOX
Patient is a 65y old  Female who presents with a chief complaint of hypoxia (03 May 2020 09:12)      HPI:  66 yo F w/ hx bipolar disorder, intermittently nonverbal presents to ER for persistent fevers and new hypoxia (80's on room air) since being diagnosed with COVID week prior as outpatient.  per aide at bedside patient noted to have poor PO intake, poor urine output and fevers/hypoxia. been checking pulse ox intermittently. denies nausea/vomiting or diarrhea. +BM's.   at baseline, ambulates and able to feed self.   ros unable to obtain as patient not responding to questions/commands    in Er , hypoxic at 85% RA and improved with 100% o2 (20 Apr 2020 18:00)    FAMILY HISTORY:  No pertinent family history in first degree relatives      INTERVAL HPI/OVERNIGHT EVENTS:  Pt. resting comfortably, denies any pain or discomfort.  Discharge home to resume 24 hour home aids when able to advance diet.    agency currently understaffed d/t COVID-19.  continuing to work on HHA placement.  Pt. with resting O2 on RA 87%- wean as tolerated        PAST MEDICAL & SURGICAL HISTORY:  Hypothyroid  Bipolar 1 disorder  No significant past surgical history      Allergies    Depakote (Hives)  lithium (Hives)    Intolerances        Vital Signs Last 24 Hrs  T(C): 36.8 (04 May 2020 10:00), Max: 36.8 (04 May 2020 10:00)  T(F): 98.3 (04 May 2020 10:00), Max: 98.3 (04 May 2020 10:00)  HR: 80 (04 May 2020 10:00) (75 - 80)  BP: 115/76 (04 May 2020 10:00) (101/60 - 115/76)  BP(mean): --  RR: 17 (04 May 2020 10:00) (14 - 18)  SpO2: 91% (04 May 2020 10:00) (91% - 95%)    RADIOLOGY & ADDITIONAL STUDIES:    MEDICATIONS:  acetaminophen  Suppository .. 650 milliGRAM(s) Rectal every 6 hours PRN  ALBUTerol    90 MICROgram(s) HFA Inhaler 2 Puff(s) Inhalation every 6 hours PRN  ascorbic acid 500 milliGRAM(s) Oral daily  benztropine 0.5 milliGRAM(s) Oral two times a day  bisacodyl Suppository 10 milliGRAM(s) Rectal daily PRN  cyanocobalamin 1000 MICROGram(s) Oral daily  dextrose 5%. 1000 milliLiter(s) IV Continuous <Continuous>  enoxaparin Injectable 40 milliGRAM(s) SubCutaneous daily  folic acid 1 milliGRAM(s) Oral daily  guaiFENesin   Syrup  (Sugar-Free) 200 milliGRAM(s) Oral every 6 hours PRN  levothyroxine 75 MICROGram(s) Oral daily  mirtazapine 15 milliGRAM(s) Oral at bedtime  multivitamin/minerals 1 Tablet(s) Oral daily  OLANZapine 5 milliGRAM(s) Oral at bedtime  OXcarbazepine 300 milliGRAM(s) Oral two times a day  polyethylene glycol 3350 17 Gram(s) Oral daily  senna 2 Tablet(s) Oral at bedtime  thiamine 100 milliGRAM(s) Oral daily Patient is a 65y old  Female who presents with a chief complaint of hypoxia (03 May 2020 09:12)      HPI:  64 yo F w/ hx bipolar disorder, intermittently nonverbal presents to ER for persistent fevers and new hypoxia (80's on room air) since being diagnosed with COVID week prior as outpatient.  per aide at bedside patient noted to have poor PO intake, poor urine output and fevers/hypoxia. been checking pulse ox intermittently. denies nausea/vomiting or diarrhea. +BM's.   at baseline, ambulates and able to feed self.   ros unable to obtain as patient not responding to questions/commands    in Er , hypoxic at 85% RA and improved with 100% o2 (20 Apr 2020 18:00)    FAMILY HISTORY:  No pertinent family history in first degree relatives      INTERVAL HPI/OVERNIGHT EVENTS:  Pt. resting comfortably, denies any pain or discomfort.  RUE is non-tender, pink, warm extremity  Discharge home to resume 24 hour home aids when able to advance diet.    agency currently understaffed d/t COVID-19. CM continuing to work on HHA placement.  Pt. with resting O2 on RA 87%- wean as tolerated        PAST MEDICAL & SURGICAL HISTORY:  Hypothyroid  Bipolar 1 disorder  No significant past surgical history      Allergies    Depakote (Hives)  lithium (Hives)    Intolerances        Vital Signs Last 24 Hrs  T(C): 36.8 (04 May 2020 10:00), Max: 36.8 (04 May 2020 10:00)  T(F): 98.3 (04 May 2020 10:00), Max: 98.3 (04 May 2020 10:00)  HR: 80 (04 May 2020 10:00) (75 - 80)  BP: 115/76 (04 May 2020 10:00) (101/60 - 115/76)  BP(mean): --  RR: 17 (04 May 2020 10:00) (14 - 18)  SpO2: 91% (04 May 2020 10:00) (91% - 95%)    RADIOLOGY & ADDITIONAL STUDIES:    MEDICATIONS:  acetaminophen  Suppository .. 650 milliGRAM(s) Rectal every 6 hours PRN  ALBUTerol    90 MICROgram(s) HFA Inhaler 2 Puff(s) Inhalation every 6 hours PRN  ascorbic acid 500 milliGRAM(s) Oral daily  benztropine 0.5 milliGRAM(s) Oral two times a day  bisacodyl Suppository 10 milliGRAM(s) Rectal daily PRN  cyanocobalamin 1000 MICROGram(s) Oral daily  dextrose 5%. 1000 milliLiter(s) IV Continuous <Continuous>  enoxaparin Injectable 40 milliGRAM(s) SubCutaneous daily  folic acid 1 milliGRAM(s) Oral daily  guaiFENesin   Syrup  (Sugar-Free) 200 milliGRAM(s) Oral every 6 hours PRN  levothyroxine 75 MICROGram(s) Oral daily  mirtazapine 15 milliGRAM(s) Oral at bedtime  multivitamin/minerals 1 Tablet(s) Oral daily  OLANZapine 5 milliGRAM(s) Oral at bedtime  OXcarbazepine 300 milliGRAM(s) Oral two times a day  polyethylene glycol 3350 17 Gram(s) Oral daily  senna 2 Tablet(s) Oral at bedtime  thiamine 100 milliGRAM(s) Oral daily

## 2020-05-04 NOTE — PROGRESS NOTE ADULT - ASSESSMENT
64 yo F w/ hx bipolar disorder, intermittently nonverbal presents to ER for persistent fevers and new hypoxia (80's on room air) since being diagnosed with COVID as outpatient. In the ED patient noted to be hypoxic at 85% RA and improved with 100% O2 with 4 L NC. Patient was noted to be hypernatremic during hospital course due to catatonia and poor PO intake. Required NGT for feeding and corrected hypernatremia levels with free water and IV fluids. Nephrology and neurology were consulted. Patient was ruled out for seizure activity as a cause of encephalopathy. Patient is currently back on an oral diet with thickened fluids. Psych meds adjusted.    #Acute metabolic/toxic encephalopathy - improved  - Multifactorial, hypernatremia, metabolic encephalopathy, possible catatonia  - CT head neg  - EEG with epileptiform activity but no clinical seizure. Continue trileptal. Signed off by Neuro  - Psych consult appreciated, c/w Remeron, ativan, zyprexa, trileptal.   - C/w cogentin    #Acute hypoxic resp failure due to COVID19 - improved  - Improving  - Completed Plaquenil course  - Pt. with resting O2 on RA 87%- wean as tolerated      #ALVARO vs CKD with hypernatremia - ALVARO resolved; hypernatremia improved  - Likely CKD per daughter history, lithium induced (no longer on Lithium)  - Renal signed off - c/w D5W  - S/p DDVAP  - Monitor BMP    #Bipolar disorder  - Continue home meds; for years was on lithium which controlled symptoms but was stopped due to kidney damage  - Psych consult appreciated, continue Remeron, Trileptal, Ativan and Zyprexa.    #Hypothyroid  - C/w synthroid    #Dysphagia   - As per GOC discussion with family/palliative, family does not want peg  - Seen by S&S, diet advanced to puree with nectar thick liquids     #Malnourished  - Plan as above    #DVT ppx - Lovenox daily

## 2020-05-04 NOTE — PROGRESS NOTE ADULT - ATTENDING COMMENTS
I have personally seen, examined and participated in the care of this patient. I have reviewed all pertinent clinical information, including history, physical exam, plan and agree with the above.   pt seen in am, comfortable-appearing in bed. no complaints. add IVF. CM following for dispo, plan for home with home aides
Attending Attestation:  I personally saw and evaluated the patient and agree with the above assessment and plan  General: in no acute distress; moving freely, less rigid, patient able to say hello and answer questions  ENMT: No nasal discharge; airway clear; edentulous; NC in place  Gastrointestinal: Soft non-tender non-distended; Normal bowel sounds  Extremities: Normal range of motion, No clubbing, cyanosis or edema  Vascular: Peripheral pulses palpable 2+ bilaterally  Neurological: Alert at times during exam and resists noxious stimuli
Case d/w PA.  Agree w/ above.  See separate note.
Attending Attestation:  I personally saw and evaluated the patient and agree with the above assessment and plan  patient without acute issues overnight  appears alert, follows some commands, eating with assistance still  agree with discontinue IV fluids  when home O2 set up can discharge patient back home.

## 2020-05-05 VITALS
SYSTOLIC BLOOD PRESSURE: 104 MMHG | DIASTOLIC BLOOD PRESSURE: 61 MMHG | HEART RATE: 88 BPM | OXYGEN SATURATION: 95 % | RESPIRATION RATE: 18 BRPM | TEMPERATURE: 99 F

## 2020-05-05 LAB — SARS-COV-2 RNA SPEC QL NAA+PROBE: DETECTED

## 2020-05-05 PROCEDURE — 82330 ASSAY OF CALCIUM: CPT

## 2020-05-05 PROCEDURE — 80048 BASIC METABOLIC PNL TOTAL CA: CPT

## 2020-05-05 PROCEDURE — 84145 PROCALCITONIN (PCT): CPT

## 2020-05-05 PROCEDURE — 83735 ASSAY OF MAGNESIUM: CPT

## 2020-05-05 PROCEDURE — 82550 ASSAY OF CK (CPK): CPT

## 2020-05-05 PROCEDURE — 96374 THER/PROPH/DIAG INJ IV PUSH: CPT

## 2020-05-05 PROCEDURE — 83615 LACTATE (LD) (LDH) ENZYME: CPT

## 2020-05-05 PROCEDURE — 84146 ASSAY OF PROLACTIN: CPT

## 2020-05-05 PROCEDURE — 82962 GLUCOSE BLOOD TEST: CPT

## 2020-05-05 PROCEDURE — 86140 C-REACTIVE PROTEIN: CPT

## 2020-05-05 PROCEDURE — 87086 URINE CULTURE/COLONY COUNT: CPT

## 2020-05-05 PROCEDURE — 86803 HEPATITIS C AB TEST: CPT

## 2020-05-05 PROCEDURE — 99239 HOSP IP/OBS DSCHRG MGMT >30: CPT | Mod: CS

## 2020-05-05 PROCEDURE — 92610 EVALUATE SWALLOWING FUNCTION: CPT

## 2020-05-05 PROCEDURE — 87635 SARS-COV-2 COVID-19 AMP PRB: CPT

## 2020-05-05 PROCEDURE — 71045 X-RAY EXAM CHEST 1 VIEW: CPT

## 2020-05-05 PROCEDURE — 97163 PT EVAL HIGH COMPLEX 45 MIN: CPT

## 2020-05-05 PROCEDURE — 99285 EMERGENCY DEPT VISIT HI MDM: CPT | Mod: 25

## 2020-05-05 PROCEDURE — 84300 ASSAY OF URINE SODIUM: CPT

## 2020-05-05 PROCEDURE — 80178 ASSAY OF LITHIUM: CPT

## 2020-05-05 PROCEDURE — 85027 COMPLETE CBC AUTOMATED: CPT

## 2020-05-05 PROCEDURE — 97530 THERAPEUTIC ACTIVITIES: CPT

## 2020-05-05 PROCEDURE — 83970 ASSAY OF PARATHORMONE: CPT

## 2020-05-05 PROCEDURE — 81001 URINALYSIS AUTO W/SCOPE: CPT

## 2020-05-05 PROCEDURE — 95819 EEG AWAKE AND ASLEEP: CPT

## 2020-05-05 PROCEDURE — 97110 THERAPEUTIC EXERCISES: CPT

## 2020-05-05 PROCEDURE — 80053 COMPREHEN METABOLIC PANEL: CPT

## 2020-05-05 PROCEDURE — 82728 ASSAY OF FERRITIN: CPT

## 2020-05-05 PROCEDURE — 80069 RENAL FUNCTION PANEL: CPT

## 2020-05-05 PROCEDURE — 82140 ASSAY OF AMMONIA: CPT

## 2020-05-05 PROCEDURE — 92526 ORAL FUNCTION THERAPY: CPT

## 2020-05-05 PROCEDURE — 84443 ASSAY THYROID STIM HORMONE: CPT

## 2020-05-05 PROCEDURE — 97116 GAIT TRAINING THERAPY: CPT

## 2020-05-05 PROCEDURE — 93005 ELECTROCARDIOGRAM TRACING: CPT

## 2020-05-05 PROCEDURE — 82310 ASSAY OF CALCIUM: CPT

## 2020-05-05 PROCEDURE — 36415 COLL VENOUS BLD VENIPUNCTURE: CPT

## 2020-05-05 PROCEDURE — 83935 ASSAY OF URINE OSMOLALITY: CPT

## 2020-05-05 PROCEDURE — 84133 ASSAY OF URINE POTASSIUM: CPT

## 2020-05-05 PROCEDURE — 70450 CT HEAD/BRAIN W/O DYE: CPT

## 2020-05-05 PROCEDURE — 82553 CREATINE MB FRACTION: CPT

## 2020-05-05 RX ADMIN — Medication 1 TABLET(S): at 13:01

## 2020-05-05 RX ADMIN — Medication 1 MILLIGRAM(S): at 13:01

## 2020-05-05 RX ADMIN — OXCARBAZEPINE 300 MILLIGRAM(S): 300 TABLET, FILM COATED ORAL at 17:13

## 2020-05-05 RX ADMIN — Medication 0.5 MILLIGRAM(S): at 17:13

## 2020-05-05 RX ADMIN — Medication 75 MICROGRAM(S): at 04:03

## 2020-05-05 RX ADMIN — MIRTAZAPINE 15 MILLIGRAM(S): 45 TABLET, ORALLY DISINTEGRATING ORAL at 21:02

## 2020-05-05 RX ADMIN — Medication 500 MILLIGRAM(S): at 13:01

## 2020-05-05 RX ADMIN — PREGABALIN 1000 MICROGRAM(S): 225 CAPSULE ORAL at 13:01

## 2020-05-05 RX ADMIN — OLANZAPINE 5 MILLIGRAM(S): 15 TABLET, FILM COATED ORAL at 21:02

## 2020-05-05 RX ADMIN — OXCARBAZEPINE 300 MILLIGRAM(S): 300 TABLET, FILM COATED ORAL at 04:04

## 2020-05-05 RX ADMIN — ENOXAPARIN SODIUM 40 MILLIGRAM(S): 100 INJECTION SUBCUTANEOUS at 13:00

## 2020-05-05 RX ADMIN — Medication 0.5 MILLIGRAM(S): at 04:03

## 2020-05-05 RX ADMIN — Medication 100 MILLIGRAM(S): at 13:01

## 2020-05-05 RX ADMIN — POLYETHYLENE GLYCOL 3350 17 GRAM(S): 17 POWDER, FOR SOLUTION ORAL at 17:13

## 2020-05-05 NOTE — PROGRESS NOTE ADULT - REASON FOR ADMISSION
hypoxia

## 2020-05-05 NOTE — PROGRESS NOTE ADULT - ASSESSMENT
66 yo F w/ hx bipolar disorder, intermittently nonverbal presents to ER for persistent fevers and new hypoxia (80's on room air) since being diagnosed with COVID as outpatient. In the ED patient noted to be hypoxic at 85% RA and improved with 100% O2 with 4 L NC. Patient was noted to be hypernatremic during hospital course due to catatonia and poor PO intake. Required NGT for feeding and corrected hypernatremia levels with free water and IV fluids. Nephrology and neurology were consulted. Patient was ruled out for seizure activity as a cause of encephalopathy. Patient is currently back on an oral diet with thickened fluids. Psych meds adjusted.    #Acute metabolic/toxic encephalopathy - improved  - Multifactorial, hypernatremia, metabolic encephalopathy, possible catatonia  - CT head neg  - EEG with epileptiform activity but no clinical seizure. Continue trileptal. Signed off by Neuro  - Psych consult appreciated, c/w Remeron, ativan, zyprexa, trileptal.   - C/w cogentin    #Acute hypoxic resp failure due to COVID19 - improved  - Improving  - Completed Plaquenil course  - Pt. with resting O2 on RA 87%- wean as tolerated  - will need home O2 - case management working on it.      #ALVARO vs CKD with hypernatremia - ALVARO resolved; hypernatremia improved  - Likely CKD per daughter history, lithium induced (no longer on Lithium)  - Renal signed off - c/w D5W  - S/p DDVAP  - Monitor BMP    #Bipolar disorder  - Continue home meds; for years was on lithium which controlled symptoms but was stopped due to kidney damage  - Psych consult appreciated, continue Remeron, Trileptal, Ativan and Zyprexa.    #Hypothyroid  - C/w synthroid    #Dysphagia   - As per GOC discussion with family/palliative, family does not want peg  - Seen by S&S, diet advanced to puree with nectar thick liquids     #Malnourished  - Plan as above    #DVT ppx - Lovenox daily

## 2020-05-05 NOTE — CHART NOTE - NSCHARTNOTEFT_GEN_A_CORE
Source: Patient [ ]  Family [ ]   other [ x]    Current Diet: Diet, Dysphagia 1 Pureed-Nectar Consistency Fluid:   Supplement Feeding Modality:  Oral  Ensure Pudding Cans or Servings Per Day:  1       Frequency:  Three Times a day (04-30-20 @ 11:29)    PO intake: Pt has varying po intake, consuming % of meals    Current Weight:   (4/20) 125 lbs  -Obtain current wt, continue to monitor. No edema noted.    Pertinent Medications: MEDICATIONS  (STANDING):  ascorbic acid 500 milliGRAM(s) Oral daily  benztropine 0.5 milliGRAM(s) Oral two times a day  cyanocobalamin 1000 MICROGram(s) Oral daily  enoxaparin Injectable 40 milliGRAM(s) SubCutaneous daily  folic acid 1 milliGRAM(s) Oral daily  levothyroxine 75 MICROGram(s) Oral daily  mirtazapine 15 milliGRAM(s) Oral at bedtime  multivitamin/minerals 1 Tablet(s) Oral daily  OLANZapine 5 milliGRAM(s) Oral at bedtime  OXcarbazepine 300 milliGRAM(s) Oral two times a day  polyethylene glycol 3350 17 Gram(s) Oral daily  senna 2 Tablet(s) Oral at bedtime  thiamine 100 milliGRAM(s) Oral daily    MEDICATIONS  (PRN):  acetaminophen  Suppository .. 650 milliGRAM(s) Rectal every 6 hours PRN Temp greater or equal to 38C (100.4F), Mild Pain (1 - 3), Moderate Pain (4 - 6)  ALBUTerol    90 MICROgram(s) HFA Inhaler 2 Puff(s) Inhalation every 6 hours PRN Shortness of Breath and/or Wheezing  bisacodyl Suppository 10 milliGRAM(s) Rectal daily PRN Constipation  guaiFENesin   Syrup  (Sugar-Free) 200 milliGRAM(s) Oral every 6 hours PRN Cough    Pertinent Labs: no recent labs    Skin: Intact per documentation     Nutrition focused physical exam not conducted at this time- found signs of malnutrition [ ]absent [ ]present    Subcutaneous fat loss: [ ] Orbital fat pads region, [ ]Buccal fat region, [ ]Triceps region,  [ ]Ribs region    Muscle wasting: [ ]Temples region, [ ]Clavicle region, [ ]Shoulder region, [ ]Scapula region, [ ]Interosseous region,  [ ]thigh region, [ ]Calf region    Estimated Needs:   [x ] no change since previous assessment  [ ] recalculated:     Current Nutrition Diagnosis: Pt remains at nutrition risk secondary to increased nutrient needs related to increased physiological demand to maintain nutrition status as evidenced by acute respiratory failure secondary to COVID and acute metabolic/toxic encephalopathy with poor po intake, now with dysphagia requiring a pureed diet with nectar thick liquids per SLP. Tube feeds now discontinued. Per documentation, family does not want PEG- seen by SLP 4/29 with recommendations for a pureed diet and nectar thick liquids. Pt needs total assistance at meals, continues with mostly good po intake at this time. Last BM 4/27 per documentation, BM at baseline per documentation. Plan for possible d/c today. RD to remain available.     Recommendations:   1) Continue diet as tolerated/per SLP recommendations.  2) Continue Ensure Pudding TID to optimize po intake and provide an additional 170 kcal, 4g protein per serving.  3) Continue vit B12 supplementation, add MVI, thiamine, and vit C.  4) Continue bowel regimen PRN.  5) Encourage po intake, monitor diet tolerance, and provide assistance at all meals.  6) Obtain daily weights to monitor trends.     Monitoring and Evaluation:   [ x] PO intake [ x] Tolerance to diet prescription [X] Weights  [X] Follow up per protocol [X] Labs.

## 2020-05-05 NOTE — DISCHARGE NOTE NURSING/CASE MANAGEMENT/SOCIAL WORK - PATIENT PORTAL LINK FT
You can access the FollowMyHealth Patient Portal offered by [facility] by registering at the following website: [email or web address]. By joining [facility]’s FollowMyHealth portal, you will also be able to view your health information using other applications (apps) compatible with our system.

## 2020-05-05 NOTE — PROGRESS NOTE ADULT - SUBJECTIVE AND OBJECTIVE BOX
CC: hypoxia (04 May 2020 11:36)    HPI:  66 yo F w/ hx bipolar disorder, intermittently nonverbal presents to ER for persistent fevers and new hypoxia (80's on room air) since being diagnosed with COVID week prior as outpatient.    In Er , hypoxic at 85% RA and improved with 100% o2 (20 Apr 2020 18:00)    INTERVAL HPI/OVERNIGHT EVENTS: Patient seen and examined lying in bed.  Patient denies any complaints.  ROS limited secondary to confusion.      Vital Signs Last 24 Hrs  T(C): 37 (05 May 2020 10:10), Max: 37 (05 May 2020 10:10)  T(F): 98.6 (05 May 2020 10:10), Max: 98.6 (05 May 2020 10:10)  HR: 74 (05 May 2020 08:15) (74 - 86)  BP: 120/74 (05 May 2020 08:15) (105/62 - 120/74)  BP(mean): --  RR: 19 (05 May 2020 08:15) (18 - 19)  SpO2: 92% (05 May 2020 08:15) (85% - 94%)  I&O's Detail      PHYSICAL EXAM:  GENERAL: NAD  HEAD:  Atraumatic, Normocephalic  NECK: Supple, No JVD, Normal thyroid  NERVOUS SYSTEM:  Alert, generalized weakness  CHEST/LUNG: Clear to auscultation bilaterally; No rales, rhonchi, wheezing, or rubs  HEART: Regular rate and rhythm; No murmurs, rubs, or gallops  ABDOMEN: Soft, Nontender, Nondistended; Bowel sounds present  EXTREMITIES:  2+ Peripheral Pulses, No clubbing, cyanosis, or edema      MEDICATIONS  (STANDING):  ascorbic acid 500 milliGRAM(s) Oral daily  benztropine 0.5 milliGRAM(s) Oral two times a day  cyanocobalamin 1000 MICROGram(s) Oral daily  enoxaparin Injectable 40 milliGRAM(s) SubCutaneous daily  folic acid 1 milliGRAM(s) Oral daily  levothyroxine 75 MICROGram(s) Oral daily  mirtazapine 15 milliGRAM(s) Oral at bedtime  multivitamin/minerals 1 Tablet(s) Oral daily  OLANZapine 5 milliGRAM(s) Oral at bedtime  OXcarbazepine 300 milliGRAM(s) Oral two times a day  polyethylene glycol 3350 17 Gram(s) Oral daily  senna 2 Tablet(s) Oral at bedtime  thiamine 100 milliGRAM(s) Oral daily    MEDICATIONS  (PRN):  acetaminophen  Suppository .. 650 milliGRAM(s) Rectal every 6 hours PRN Temp greater or equal to 38C (100.4F), Mild Pain (1 - 3), Moderate Pain (4 - 6)  ALBUTerol    90 MICROgram(s) HFA Inhaler 2 Puff(s) Inhalation every 6 hours PRN Shortness of Breath and/or Wheezing  bisacodyl Suppository 10 milliGRAM(s) Rectal daily PRN Constipation  guaiFENesin   Syrup  (Sugar-Free) 200 milliGRAM(s) Oral every 6 hours PRN Cough

## 2020-05-05 NOTE — CHART NOTE - NSCHARTNOTEFT_GEN_A_CORE
Advance Directives:   Patient is FULL CODE  Surrogate/HCP is her daughter Arcelia Stephens  Discussed care with Venessa Tinsley NP 6 tower, plan for potential discharge home with aide support  If GOC change or acute change in patient's status warranting palliative care reconsult please feel free to contact us    Thank you for the opportunity to assist with the care of this patient.   [facility] [phone].

## 2020-12-30 NOTE — ED ADULT TRIAGE NOTE - IDEAL BODY WEIGHT(KG)
----- Message from Lakesha Chan sent at 12/30/2020 11:00 AM CST -----  Type: Needs Medical Advice  Who Called:  Patient   Best Call Back Number:    Additional Information: Per patient requesting a call back regarding her COVID infusion scheduled for 1p today    \    
I spoke with pt and she will keep her appt in [place] for her infusion  
57

## 2021-12-24 NOTE — BEHAVIORAL HEALTH ASSESSMENT NOTE - NSBHATTESTSEENBY_PSY_A_CORE
Normal rate, regular rhythm.  Heart sounds S1, S2.  No murmurs, rubs or gallops. NP with telephonic supervision from Attending Psychiatrist

## 2022-10-15 NOTE — ED PROVIDER NOTE - NEUROLOGICAL, MLM
Labs/Imaging Studies/Medications Alert and oriented, no focal deficits, no motor or sensory deficits.

## 2023-01-30 NOTE — PROGRESS NOTE ADULT - NSREFPHYEXREFTO_GEN_ALL_CORE
Inpatient Physical Exam
(1) Female

## 2023-03-30 NOTE — PROGRESS NOTE ADULT - ASSESSMENT
delirium - hypoactive - multifactorial - COVID viral pneumonia and systemic inflammatory response hypernatremia- poor nutritional status  - ?contribution of multiple psychotropic medications You  will continue to reside with your  Phu Briceno  at  [phone] [place] [phone]    During this admission you advised social work that your primary contact is your daughter Jazlyn Rivera - who resides in [place]- [phone] and Jazlyn agreed advised your  of this plan./Home

## 2023-04-16 NOTE — H&P ADULT - NSICDXPASTSURGICALHX_GEN_ALL_CORE_FT
You were seen in the emergency department for evaluation of rectal bleeding. Your blood pressure was also high.     Your exam did show a hemorrhoid. I suspect this is the cause of your bleeding. Your blood pressure was also elevated. I suspect this is a combination of being due for your medication, and also that you were anxious about the bleeding. Please call and schedule follow up with your PCP.    While I don't believe you have an emergent illness right now, your illness may progress and sometimes in unanticipated ways. If you feel you are worsening, developing new symptoms, or if you have any other concerns, please follow up with your PCP sooner, or return to the ED for evaluation.     No significant past surgical history

## 2024-05-20 VITALS
WEIGHT: 180 LBS | DIASTOLIC BLOOD PRESSURE: 76 MMHG | HEART RATE: 92 BPM | HEIGHT: 65 IN | SYSTOLIC BLOOD PRESSURE: 133 MMHG | BODY MASS INDEX: 29.99 KG/M2

## 2024-05-22 NOTE — PROCEDURE NOTE - NSPROCDETAILS_GEN_ALL_CORE
I have reviewed the notes, assessments, and/or procedures performed by the resident, I concur with her/his documentation of Sarah Saravia.  Date of Service: 4/10/2024   location identified, feeding tube inserted/right nare

## 2024-07-04 NOTE — ED ADULT NURSE NOTE - CAS TRG GEN SKIN CONDITION
1604 Patient is requesting norflex for connective tissue disease in the neck.   1700 Patient stated is waiting on one final test for official diagnosis but was told it is likely Lupus for the connective tissue disease.   1809 Page MD Gallegos per patient request for pain medicine   1812 MD Gallegos provided new order for 1x dose Dilaudid 1mg.   1833 Educated patient this is a one time dose. Patient stated will do oral medication after. After administration of medication patient stated \"Did you give me the medicine I did not feel that one\" Nurse confirmed. Patient asked \"did you flush it after\" Nurse confirmed.    Warm

## 2024-09-05 ENCOUNTER — NON-APPOINTMENT (OUTPATIENT)
Age: 69
End: 2024-09-05

## 2024-09-05 DIAGNOSIS — Z87.440 PERSONAL HISTORY OF URINARY (TRACT) INFECTIONS: ICD-10-CM

## 2024-09-05 DIAGNOSIS — Z78.9 OTHER SPECIFIED HEALTH STATUS: ICD-10-CM

## 2024-09-05 DIAGNOSIS — I10 ESSENTIAL (PRIMARY) HYPERTENSION: ICD-10-CM

## 2024-09-05 DIAGNOSIS — E21.3 HYPERPARATHYROIDISM, UNSPECIFIED: ICD-10-CM

## 2024-09-05 DIAGNOSIS — N17.9 ACUTE KIDNEY FAILURE, UNSPECIFIED: ICD-10-CM

## 2024-09-05 DIAGNOSIS — R60.9 EDEMA, UNSPECIFIED: ICD-10-CM

## 2024-09-05 DIAGNOSIS — E87.0 HYPEROSMOLALITY AND HYPERNATREMIA: ICD-10-CM

## 2024-09-05 DIAGNOSIS — E03.9 HYPOTHYROIDISM, UNSPECIFIED: ICD-10-CM

## 2024-09-05 DIAGNOSIS — N18.32 CHRONIC KIDNEY DISEASE, STAGE 3B: ICD-10-CM

## 2024-09-05 DIAGNOSIS — N25.1 NEPHROGENIC DIABETES INSIPIDUS: ICD-10-CM

## 2024-09-05 DIAGNOSIS — F31.9 BIPOLAR DISORDER, UNSPECIFIED: ICD-10-CM

## 2024-09-05 PROBLEM — Z00.00 ENCOUNTER FOR PREVENTIVE HEALTH EXAMINATION: Status: ACTIVE | Noted: 2024-09-05

## 2024-09-05 RX ORDER — OXCARBAZEPINE 150 MG/1
150 TABLET, FILM COATED ORAL
Refills: 0 | Status: ACTIVE | COMMUNITY

## 2024-09-05 RX ORDER — SERTRALINE HYDROCHLORIDE 50 MG/1
50 TABLET, FILM COATED ORAL DAILY
Refills: 0 | Status: ACTIVE | COMMUNITY

## 2024-09-05 RX ORDER — CLONAZEPAM 0.5 MG/1
0.5 TABLET, ORALLY DISINTEGRATING ORAL TWICE DAILY
Refills: 0 | Status: ACTIVE | COMMUNITY

## 2024-09-05 RX ORDER — LEVOTHYROXINE SODIUM 0.07 MG/1
75 TABLET ORAL DAILY
Refills: 0 | Status: ACTIVE | COMMUNITY

## 2024-09-05 RX ORDER — CINACALCET 30 MG/1
30 TABLET ORAL
Refills: 0 | Status: ACTIVE | COMMUNITY

## 2024-09-05 RX ORDER — AMLODIPINE BESYLATE 2.5 MG/1
2.5 TABLET ORAL DAILY
Refills: 0 | Status: ACTIVE | COMMUNITY

## 2024-09-05 RX ORDER — HYDROCHLOROTHIAZIDE 25 MG/1
25 TABLET ORAL DAILY
Refills: 0 | Status: ACTIVE | COMMUNITY

## 2024-09-05 RX ORDER — DOCUSATE SODIUM 100 MG
100 TABLET ORAL TWICE DAILY
Refills: 0 | Status: ACTIVE | COMMUNITY

## 2024-09-25 ENCOUNTER — APPOINTMENT (OUTPATIENT)
Age: 69
End: 2024-09-25
Payer: MEDICARE

## 2024-09-25 VITALS
BODY MASS INDEX: 29.99 KG/M2 | HEART RATE: 109 BPM | DIASTOLIC BLOOD PRESSURE: 77 MMHG | WEIGHT: 180 LBS | HEIGHT: 65 IN | SYSTOLIC BLOOD PRESSURE: 149 MMHG

## 2024-09-25 DIAGNOSIS — E03.9 HYPOTHYROIDISM, UNSPECIFIED: ICD-10-CM

## 2024-09-25 DIAGNOSIS — E87.1 HYPO-OSMOLALITY AND HYPONATREMIA: ICD-10-CM

## 2024-09-25 DIAGNOSIS — I10 ESSENTIAL (PRIMARY) HYPERTENSION: ICD-10-CM

## 2024-09-25 DIAGNOSIS — N18.32 CHRONIC KIDNEY DISEASE, STAGE 3B: ICD-10-CM

## 2024-09-25 DIAGNOSIS — M89.9 CHRONIC KIDNEY DISEASE, STAGE 3B: ICD-10-CM

## 2024-09-25 DIAGNOSIS — E21.3 HYPERPARATHYROIDISM, UNSPECIFIED: ICD-10-CM

## 2024-09-25 DIAGNOSIS — E83.9 CHRONIC KIDNEY DISEASE, STAGE 3B: ICD-10-CM

## 2024-09-25 PROCEDURE — 99214 OFFICE O/P EST MOD 30 MIN: CPT

## 2024-09-25 PROCEDURE — 99204 OFFICE O/P NEW MOD 45 MIN: CPT

## 2024-09-25 NOTE — PLAN
[TextEntry] : Plan Keep on current medications  HCTZ 25 mg daily  Weight reduction Physical activity as tolerated  Follow up with labs in 4 months

## 2024-09-25 NOTE — REVIEW OF SYSTEMS
[TextEntry] :  Review of Systems: general: No weight loss, No recent fever, chills HEENT: no headache, no change in vision or hearing Pulmonary: No SOB, or cough  CVS: No chest pain, JOHNSON, or change in exercise tolerance  Gastrointestinal: No Nausea/vomiting/constipation/diarrhea : No complaint of dysuria or urinary frequency, no excess foaming Endocrine: Denies heat/cold intolerance Hematological: Denies dizzy, bruising, bleeding Skin: Denies no new rash, lesions, ulcer  Musco skeletal: No edema, cyanosis, or new ulcers Neurological: No headache, dizziness, vertigo

## 2024-09-25 NOTE — RESULTS/DATA
[TextEntry] :  9/18/24: BMP: 144/4.2/100/30/17/1.8 eGFR 30 last cr 1.76 ( 5/13/24) CBC: 6.3/13.4/223

## 2024-09-25 NOTE — ASSESSMENT
[FreeTextEntry1] : Assessment: CKD Stage 3 B is almost the same No edema HTN is controlled Serum sodium is acceptable

## 2024-09-25 NOTE — HISTORY OF PRESENT ILLNESS
[FreeTextEntry1] : 69 year old female with PMHx of ALVARO, UTI, Hypernatremia, BPD, Schizophrenia, Neurogenic DI, HPTD, and CKD 3.  She went to [facility] in 01/2019 for ALVARO and hypernatremia she improved and was discharged with 24 hours aid.

## 2024-09-25 NOTE — PHYSICAL EXAM
[TextEntry] : Physical Examination:  General: Not in acute distress Head: Normocephalic, no lesions Ears: No exudate discharge Eyes: TAMMY, Fundi grossly normal Nose: No obstruction or significant discharge Throat: Clear, no exudates or lesions Chest: Lungs clear, no rales, no rhonchi, no wheezes CVS: S1/S2, RR, no murmurs, no rubs Abdomen: Soft NT/ND, +BS Extremities: No edema, ulcers, or cyanosis Neurological: Alert, awake, no gross focal deficits

## 2024-09-25 NOTE — HISTORY OF PRESENT ILLNESS
[FreeTextEntry1] : 69 year old female with PMHx of ALVARO, UTI, Hypernatremia, BPD, Schizophrenia, Neurogenic DI, HPTD, and CKD 3.  She went to [facility] in 01/2019 for ALVARO and hypernatremia she improved and was discharged with 24 hours aid.

## 2025-01-27 ENCOUNTER — LABORATORY RESULT (OUTPATIENT)
Age: 70
End: 2025-01-27

## 2025-01-27 ENCOUNTER — APPOINTMENT (OUTPATIENT)
Age: 70
End: 2025-01-27

## 2025-01-28 ENCOUNTER — LABORATORY RESULT (OUTPATIENT)
Age: 70
End: 2025-01-28

## 2025-02-24 ENCOUNTER — APPOINTMENT (OUTPATIENT)
Age: 70
End: 2025-02-24
Payer: MEDICARE

## 2025-02-24 VITALS
HEIGHT: 65 IN | HEART RATE: 102 BPM | BODY MASS INDEX: 28.32 KG/M2 | DIASTOLIC BLOOD PRESSURE: 99 MMHG | WEIGHT: 170 LBS | SYSTOLIC BLOOD PRESSURE: 170 MMHG

## 2025-02-24 DIAGNOSIS — N18.32 CHRONIC KIDNEY DISEASE, STAGE 3B: ICD-10-CM

## 2025-02-24 DIAGNOSIS — I10 ESSENTIAL (PRIMARY) HYPERTENSION: ICD-10-CM

## 2025-02-24 PROCEDURE — 99214 OFFICE O/P EST MOD 30 MIN: CPT

## 2025-03-14 ENCOUNTER — RX RENEWAL (OUTPATIENT)
Age: 70
End: 2025-03-14

## 2025-06-06 ENCOUNTER — RX RENEWAL (OUTPATIENT)
Age: 70
End: 2025-06-06

## 2025-07-02 ENCOUNTER — APPOINTMENT (OUTPATIENT)
Age: 70
End: 2025-07-02

## 2025-07-03 ENCOUNTER — LABORATORY RESULT (OUTPATIENT)
Age: 70
End: 2025-07-03

## 2025-07-07 ENCOUNTER — LABORATORY RESULT (OUTPATIENT)
Age: 70
End: 2025-07-07

## 2025-07-08 ENCOUNTER — LABORATORY RESULT (OUTPATIENT)
Age: 70
End: 2025-07-08

## 2025-07-30 ENCOUNTER — APPOINTMENT (OUTPATIENT)
Age: 70
End: 2025-07-30
Payer: MEDICARE

## 2025-07-30 VITALS
DIASTOLIC BLOOD PRESSURE: 79 MMHG | WEIGHT: 180 LBS | BODY MASS INDEX: 29.99 KG/M2 | SYSTOLIC BLOOD PRESSURE: 175 MMHG | HEART RATE: 94 BPM | HEIGHT: 65 IN

## 2025-07-30 DIAGNOSIS — N18.32 CHRONIC KIDNEY DISEASE, STAGE 3B: ICD-10-CM

## 2025-07-30 DIAGNOSIS — I10 ESSENTIAL (PRIMARY) HYPERTENSION: ICD-10-CM

## 2025-07-30 PROCEDURE — 99214 OFFICE O/P EST MOD 30 MIN: CPT

## 2025-07-30 RX ORDER — LOSARTAN POTASSIUM 25 MG/1
25 TABLET, FILM COATED ORAL
Qty: 90 | Refills: 3 | Status: ACTIVE | COMMUNITY
Start: 2025-07-30 | End: 1900-01-01

## 2025-09-05 DIAGNOSIS — N18.32 CHRONIC KIDNEY DISEASE, STAGE 3B: ICD-10-CM

## 2025-09-05 RX ORDER — TORSEMIDE 20 MG/1
20 TABLET ORAL
Qty: 90 | Refills: 0 | Status: ACTIVE | COMMUNITY
Start: 2025-09-05 | End: 1900-01-01

## 2025-09-08 ENCOUNTER — RX RENEWAL (OUTPATIENT)
Age: 70
End: 2025-09-08